# Patient Record
Sex: FEMALE | Race: WHITE | NOT HISPANIC OR LATINO | Employment: FULL TIME | URBAN - METROPOLITAN AREA
[De-identification: names, ages, dates, MRNs, and addresses within clinical notes are randomized per-mention and may not be internally consistent; named-entity substitution may affect disease eponyms.]

---

## 2018-06-20 ENCOUNTER — NURSE TRIAGE (OUTPATIENT)
Dept: NURSING | Facility: CLINIC | Age: 48
End: 2018-06-20

## 2018-06-20 NOTE — TELEPHONE ENCOUNTER
She will go to the ER for evaluation. She said she can urinate during the day just a trickle and her bladder still feels full. It seems worse at night.  Sugar Roche RN-[facility]    Reason for Disposition    [1] Unable to urinate (or only a few drops) > 4 hours AND     [2] bladder feels very full (e.g., palpable bladder or strong urge to urinate)    Additional Information    Negative: Shock suspected (e.g., cold/pale/clammy skin, too weak to stand, low BP, rapid pulse)    Negative: Sounds like a life-threatening emergency to the triager    Negative: Followed a genital area injury    Negative: Followed a genital area injury (penis, scrotum)    Negative: Vaginal discharge    Negative: Pus (white, yellow) or bloody discharge from end of penis    Negative: [1] Taking antibiotic for urinary tract infection (UTI) AND [2] female    Negative: [1] Taking antibiotic for urinary tract infection (UTI) AND [2] male    Negative: [1] Discomfort (pain, burning or stinging) when passing urine AND [2] pregnant    Negative: [1] Discomfort (pain, burning or stinging) when passing urine AND [2] postpartum < 1 month    Negative: [1] Discomfort (pain, burning or stinging) when passing urine AND [2] female    Negative: [1] Discomfort (pain, burning or stinging) when passing urine AND [2] male    Negative: Pain or itching in the vulvar area    Negative: Pain in scrotum is main symptom    Negative: Blood in the urine is main symptom    Negative: Symptoms arising from use of a urinary catheter ([place] or [place])    Protocols used: URINARY SYMPTOMS-ADULT-

## 2018-06-21 ENCOUNTER — OFFICE VISIT (OUTPATIENT)
Dept: FAMILY MEDICINE | Facility: CLINIC | Age: 48
End: 2018-06-21
Payer: COMMERCIAL

## 2018-06-21 VITALS
SYSTOLIC BLOOD PRESSURE: 136 MMHG | HEIGHT: 65 IN | TEMPERATURE: 98.5 F | WEIGHT: 168 LBS | BODY MASS INDEX: 27.99 KG/M2 | DIASTOLIC BLOOD PRESSURE: 80 MMHG | HEART RATE: 79 BPM | OXYGEN SATURATION: 100 %

## 2018-06-21 DIAGNOSIS — E04.1 NONTOXIC UNINODULAR GOITER: ICD-10-CM

## 2018-06-21 DIAGNOSIS — Z98.890 HISTORY OF UMBILICAL HERNIA REPAIR: ICD-10-CM

## 2018-06-21 DIAGNOSIS — R33.9 URINARY RETENTION: Primary | ICD-10-CM

## 2018-06-21 DIAGNOSIS — Z97.5 IUD (INTRAUTERINE DEVICE) IN PLACE: ICD-10-CM

## 2018-06-21 DIAGNOSIS — J45.20 ASTHMA, MILD INTERMITTENT, WELL-CONTROLLED: ICD-10-CM

## 2018-06-21 DIAGNOSIS — Z87.19 HISTORY OF UMBILICAL HERNIA REPAIR: ICD-10-CM

## 2018-06-21 DIAGNOSIS — Z00.01 ENCOUNTER FOR ROUTINE ADULT MEDICAL EXAM WITH ABNORMAL FINDINGS: ICD-10-CM

## 2018-06-21 PROBLEM — K42.9 UMBILICAL HERNIA: Status: ACTIVE | Noted: 2017-02-15

## 2018-06-21 LAB
ALBUMIN UR-MCNC: NEGATIVE MG/DL
ANION GAP SERPL CALCULATED.3IONS-SCNC: 7 MMOL/L (ref 3–14)
APPEARANCE UR: CLEAR
BACTERIA #/AREA URNS HPF: ABNORMAL /HPF
BILIRUB UR QL STRIP: NEGATIVE
BUN SERPL-MCNC: 12 MG/DL (ref 7–30)
CALCIUM SERPL-MCNC: 9 MG/DL (ref 8.5–10.1)
CHLORIDE SERPL-SCNC: 104 MMOL/L (ref 94–109)
CHOLEST SERPL-MCNC: 149 MG/DL
CO2 SERPL-SCNC: 28 MMOL/L (ref 20–32)
COLOR UR AUTO: YELLOW
CREAT SERPL-MCNC: 0.65 MG/DL (ref 0.52–1.04)
GFR SERPL CREATININE-BSD FRML MDRD: >90 ML/MIN/1.7M2
GLUCOSE SERPL-MCNC: 94 MG/DL (ref 70–99)
GLUCOSE UR STRIP-MCNC: NEGATIVE MG/DL
HDLC SERPL-MCNC: 92 MG/DL
HGB BLD-MCNC: 14.2 G/DL (ref 11.7–15.7)
HGB UR QL STRIP: ABNORMAL
KETONES UR STRIP-MCNC: ABNORMAL MG/DL
LDLC SERPL CALC-MCNC: 49 MG/DL
LEUKOCYTE ESTERASE UR QL STRIP: NEGATIVE
NITRATE UR QL: NEGATIVE
NON-SQ EPI CELLS #/AREA URNS LPF: ABNORMAL /LPF
NONHDLC SERPL-MCNC: 57 MG/DL
PH UR STRIP: 6 PH (ref 5–7)
POTASSIUM SERPL-SCNC: 4.1 MMOL/L (ref 3.4–5.3)
RBC #/AREA URNS AUTO: ABNORMAL /HPF
SODIUM SERPL-SCNC: 139 MMOL/L (ref 133–144)
SOURCE: ABNORMAL
SP GR UR STRIP: <=1.005 (ref 1–1.03)
TRIGL SERPL-MCNC: 39 MG/DL
TSH SERPL DL<=0.005 MIU/L-ACNC: 2.68 MU/L (ref 0.4–4)
UROBILINOGEN UR STRIP-ACNC: 0.2 EU/DL (ref 0.2–1)
WBC #/AREA URNS AUTO: ABNORMAL /HPF

## 2018-06-21 PROCEDURE — 99386 PREV VISIT NEW AGE 40-64: CPT | Performed by: NURSE PRACTITIONER

## 2018-06-21 PROCEDURE — 82306 VITAMIN D 25 HYDROXY: CPT | Performed by: NURSE PRACTITIONER

## 2018-06-21 PROCEDURE — 80048 BASIC METABOLIC PNL TOTAL CA: CPT | Performed by: NURSE PRACTITIONER

## 2018-06-21 PROCEDURE — 81001 URINALYSIS AUTO W/SCOPE: CPT | Performed by: NURSE PRACTITIONER

## 2018-06-21 PROCEDURE — 84443 ASSAY THYROID STIM HORMONE: CPT | Performed by: NURSE PRACTITIONER

## 2018-06-21 PROCEDURE — 36415 COLL VENOUS BLD VENIPUNCTURE: CPT | Performed by: NURSE PRACTITIONER

## 2018-06-21 PROCEDURE — 99213 OFFICE O/P EST LOW 20 MIN: CPT | Mod: 25 | Performed by: NURSE PRACTITIONER

## 2018-06-21 PROCEDURE — 80061 LIPID PANEL: CPT | Performed by: NURSE PRACTITIONER

## 2018-06-21 PROCEDURE — 85018 HEMOGLOBIN: CPT | Performed by: NURSE PRACTITIONER

## 2018-06-21 ASSESSMENT — PAIN SCALES - GENERAL: PAINLEVEL: NO PAIN (0)

## 2018-06-21 NOTE — LETTER
[address] [address]  [phone]                                                                                                           Marcella Justice  [address]  [place] [address]    June 22, 2018      Shawn Naranjo   I am happy to report that your labs for vitamin D, cholesterol, thyroid, anemia and kidney function were normal.  Please continue with the plan as discussed in your visit and return if your symptoms are worsening.     Feel free to contact me with any questions or concerns.  Thank you for allowing me to participate in your care.     Suzy Fajardo, APRN CNP/smj    Results for orders placed or performed in visit on 06/21/18   *UA reflex to Microscopic and Culture ([facility] and [facility] (except [place] and [place])   Result Value Ref Range    Color Urine Yellow     Appearance Urine Clear     Glucose Urine Negative NEG^Negative mg/dL    Bilirubin Urine Negative NEG^Negative    Ketones Urine Trace (A) NEG^Negative mg/dL    Specific Gravity Urine <=1.005 1.003 - 1.035    Blood Urine Small (A) NEG^Negative    pH Urine 6.0 5.0 - 7.0 pH    Protein Albumin Urine Negative NEG^Negative mg/dL    Urobilinogen Urine 0.2 0.2 - 1.0 EU/dL    Nitrite Urine Negative NEG^Negative    Leukocyte Esterase Urine Negative NEG^Negative    Source Midstream Urine    Urine Microscopic   Result Value Ref Range    WBC Urine 0 - 5 OTO5^0 - 5 /HPF    RBC Urine O - 2 OTO2^O - 2 /HPF    Squamous Epithelial /LPF Urine Moderate (A) FEW^Few /LPF    Bacteria Urine Few (A) NEG^Negative /HPF   Vitamin D Deficiency   Result Value Ref Range    Vitamin D Deficiency screening 30 20 - 75 ug/L   Lipid panel reflex to direct LDL Fasting   Result Value Ref Range    Cholesterol 149 <200 mg/dL    Triglycerides 39 <150 mg/dL    HDL Cholesterol 92 >49 mg/dL    LDL Cholesterol Calculated 49 <100 mg/dL    Non HDL Cholesterol 57 <130 mg/dL   Hemoglobin    Result Value Ref Range    Hemoglobin 14.2 11.7 - 15.7 g/dL   TSH with free T4 reflex   Result Value Ref Range    TSH 2.68 0.40 - 4.00 mU/L   Basic metabolic panel  (Ca, Cl, CO2, Creat, Gluc, K, Na, BUN)   Result Value Ref Range    Sodium 139 133 - 144 mmol/L    Potassium 4.1 3.4 - 5.3 mmol/L    Chloride 104 94 - 109 mmol/L    Carbon Dioxide 28 20 - 32 mmol/L    Anion Gap 7 3 - 14 mmol/L    Glucose 94 70 - 99 mg/dL    Urea Nitrogen 12 7 - 30 mg/dL    Creatinine 0.65 0.52 - 1.04 mg/dL    GFR Estimate >90 >60 mL/min/1.7m2    GFR Estimate If Black >90 >60 mL/min/1.7m2    Calcium 9.0 8.5 - 10.1 mg/dL

## 2018-06-21 NOTE — PROGRESS NOTES
SUBJECTIVE:   CC: Marcella Justice is an 47 year old woman who presents for preventive health visit.     Healthy Habits:    Do you get at least three servings of calcium containing foods daily (dairy, green leafy vegetables, etc.)? yes    Amount of exercise or daily activities, outside of work: sometimes    Problems taking medications regularly not applicable    Medication side effects: No    Have you had an eye exam in the past two years? no    Do you see a dentist twice per year? yes    Do you have sleep apnea, excessive snoring or daytime drowsiness?no  Pap smear done on this date: 2016 (approximately), by this group: [facility], results were NIL, HPV neg.       Urinary Symptoms  Onset: x 2 weeks    Description:  Vaginal Discharge: none   Itching (Pruritis): no   Burning sensation:  no   Odor: no     Accompanying Signs & Symptoms:  Pain with Urination: no   Abdominal Pain: yes discomfort  Fever: no     History:   Sexually active: YES  New Partner: no   Possibility of Pregnancy:  No    Precipitating factors:   Recent Antibiotic Use: no     Alleviating factors:  None    Having a hard time emptying out the bladder, states this is much better today versus yesterday and the day before.  She states she now does feel empty after and the stream is stronger than it was.  States symptoms are worse at night.  Sometimes is unable to urinate when gets up in the middle of the night.  She is able to urinate better during the daytime.  Has history of abdominal surgery (umbilical hernia repair and tubal ligation) but no others.  Denies pelvic pain, just pressure when unable to urinate.  She had four vaginal deliveries previously.  Denies back injury.  Denies groin numbness, no numbness or weakness or shooting pains. Does see chiropractor about every 3 weeks for mid back pain and adjustments.  Had has some lower back pain lately which has been mild, but, again, no shooting pain.  Has had this issue in the past about a  year ago and resolved on its own.  Denies incontinence- stress or urgency.    Therapies Tried and outcome: no medication was taken.  Mirena for heavy periods, placed 1.5 years ago.  Has not bled at all since September 2017.  Was not anemic at this time.    Today's PHQ-2 Score:   PHQ-2 ( 1999 [employer]) 6/21/2018 3/27/2014   Q1: Little interest or pleasure in doing things 0 0   Q2: Feeling down, depressed or hopeless 0 0   PHQ-2 Score 0 0       Abuse: Current or Past(Physical, Sexual or Emotional)- No  Do you feel safe in your environment - Yes    Social History   Substance Use Topics     Smoking status: Never Smoker     Smokeless tobacco: Never Used     Alcohol use Yes      Comment: a little      If you drink alcohol do you typically have >3 drinks per day or >7 drinks per week? No                     Reviewed orders with patient.  Reviewed health maintenance and updated orders accordingly - Yes  Labs reviewed in EPIC  BP Readings from Last 3 Encounters:   06/21/18 136/80   03/27/14 (!) 139/92    Wt Readings from Last 3 Encounters:   06/21/18 168 lb (76.2 kg)   03/27/14 160 lb 9.6 oz (72.8 kg)                  Patient Active Problem List   Diagnosis     Asthma, mild intermittent, well-controlled     Nontoxic uninodular goiter     History of umbilical hernia repair     IUD (intrauterine device) in place     Urinary retention     Past Surgical History:   Procedure Laterality Date     HERNIA REPAIR  02/2017    umbical hernia       Social History   Substance Use Topics     Smoking status: Never Smoker     Smokeless tobacco: Never Used     Alcohol use Yes      Comment: a little      Family History   Problem Relation Age of Onset     Thyroid Disease Mother          No current outpatient prescriptions on file.     Allergies   Allergen Reactions     Bactrim [Sulfamethoxazole W/Trimethoprim]      Cyclobenzaprine      Made patient freak out for about 24 hours      Morphine      No lab results found.     Mammogram not appropriate  for this patient based on age.    Pertinent mammograms are reviewed under the imaging tab.  History of abnormal Pap smear: NO - age 30-65 PAP every 5 years with negative HPV co-testing recommended    Reviewed and updated as needed this visit by clinical staff  Tobacco  Allergies  Meds  Problems  Med Hx  Surg Hx  Fam Hx  Soc Hx          Reviewed and updated as needed this visit by Provider  Tobacco  Allergies  Meds  Problems  Med Hx  Surg Hx  Fam Hx  Soc Hx         Past Medical History:   Diagnosis Date     Asthma       Past Surgical History:   Procedure Laterality Date     HERNIA REPAIR  02/2017    umbical hernia     Obstetric History     No data available      Obstetric Comments   4 vaginal deliveries       ROS:  CONSTITUTIONAL: NEGATIVE for fever, chills, change in weight  INTEGUMENTARU/SKIN: NEGATIVE for worrisome rashes, moles or lesions  EYES: NEGATIVE for vision changes or irritation  ENT: NEGATIVE for ear, mouth and throat problems  RESP: NEGATIVE for significant cough or SOB  BREAST: NEGATIVE for masses, tenderness or discharge  CV: NEGATIVE for chest pain, palpitations or peripheral edema  GI: NEGATIVE for nausea, abdominal pain, heartburn, or change in bowel habits   female: see HPI  MUSCULOSKELETAL: NEGATIVE for significant arthralgias or myalgia  NEURO: NEGATIVE for weakness, dizziness or paresthesias  PSYCHIATRIC: NEGATIVE for changes in mood or affect    OBJECTIVE:   There were no vitals taken for this visit.  EXAM:  GENERAL: healthy, alert and no distress  EYES: Eyes grossly normal to inspection, PERRL and conjunctivae and sclerae normal  HENT: ear canals and TM's normal, nose and mouth without ulcers or lesions  NECK: no adenopathy, no asymmetry, masses, or scars and thyroid normal to palpation  RESP: lungs clear to auscultation - no rales, rhonchi or wheezes  CV: regular rate and rhythm, normal S1 S2, no S3 or S4, no murmur, click or rub, no peripheral edema and peripheral pulses  strong  ABDOMEN: soft, nontender, no hepatosplenomegaly, no masses and bowel sounds normal  bladder not palpable, no suprapubic tenderness  MS: no gross musculoskeletal defects noted, no edema  SKIN: no suspicious lesions or rashes  NEURO: Normal strength and tone, mentation intact and speech normal  PSYCH: mentation appears normal, affect normal/bright    ASSESSMENT/PLAN:   1. Encounter for routine adult medical exam with abnormal findings  - Vitamin D Deficiency  - Lipid panel reflex to direct LDL Fasting  - Hemoglobin    2. Urinary retention  Resolved as of today but has had a similar episode in the past.  No palpable abdominal masses.  Urine not indicative of infection.  Will have patient see urology.  Consider pelvic/abdominal US as well pending lab results.  - *UA reflex to Microscopic and Culture ([facility] [place] [facility] (except [place] and [place])  - Urine Microscopic  - UROLOGY ADULT REFERRAL  - Basic metabolic panel  (Ca, Cl, CO2, Creat, Gluc, K, Na, BUN)    3. IUD (intrauterine device) in place  Place in 2016 for heavy menses.  - levonorgestrel (MIRENA) 20 MCG/24HR IUD; 1 each (20 mcg) by Intrauterine route once for 1 dose  Dispense: 1 each; Refill: 0    4. Asthma, mild intermittent, well-controlled  Only flares with upper respiratory tract infections.  Has only albuterol inhaler.    5. History of umbilical hernia repair  1 year ago.    6. Nontoxic uninodular goiter  Reviewed previous ultrasounds and biopsies.  Will repeat in one year to ensure stability, sooner if symptomatic.  - TSH with free T4 reflex    COUNSELING:   Reviewed preventive health counseling, as reflected in patient instructions       Regular exercise       Healthy diet/nutrition       Vision screening       Osteoporosis Prevention/Bone Health         reports that she has never smoked. She has never used smokeless tobacco.    Estimated body mass index is 26.73 kg/(m^2) as calculated from the following:    Height as of 3/27/14: 5'  "5\" (1.651 m).    Weight as of 3/27/14: 160 lb 9.6 oz (72.8 kg).   Weight management plan: Discussed healthy diet and exercise guidelines and patient will follow up in 12 months in clinic to re-evaluate.    Counseling Resources:  ATP IV Guidelines  Pooled Cohorts Equation Calculator  Breast Cancer Risk Calculator  FRAX Risk Assessment  ICSI Preventive Guidelines  Dietary Guidelines for Americans, 2010  USDA's MyPlate  ASA Prophylaxis  Lung CA Screening    SHARMAINE Martins CNP  [facility]    Patient Instructions     At [facility], we strive to deliver an exceptional experience to you, every time we see you.  If you receive a survey in the mail, please send us back your thoughts. We really do value your feedback.    Based on your medical history, these are the current health maintenance/preventive care services that you are due for (some may have been done at this visit.)  Health Maintenance Due   Topic Date Due     PHQ-2 Q1 YR  07/18/1981     TETANUS IMMUNIZATION (SYSTEM ASSIGNED)  07/18/1987     HIV SCREEN (SYSTEM ASSIGNED)  07/18/1987     PAP SCREENING Q3 YR (SYSTEM ASSIGNED)  07/18/1990     LIPID SCREEN Q5 YR FEMALE (SYSTEM ASSIGNED)  07/18/2014       Suggested websites for health information:  Www."[employer] : Up to date and easily searchable information on multiple topics.  Www.medlineplus.gov : medication info, interactive tutorials, watch real surgeries online  Www.familydoctor.org : good info from the Academy of Family Physicians  Www.cdc.gov : public health info, travel advisories, epidemics (H1N1)  Www.aap.org : children's health info, normal development, vaccinations  Www.health.state.[place] : [place] dept of health, public health issues in [place], N1N1    Your care team:                            Family Medicine Internal Medicine   MD David Forrest MD Shantel Branch-Fleming, MD Katya Georgiev PA-C Megan Hill, APRN CNP Nam Ho, MD Pediatrics "   APARNA Johnson CNP Paula Brito, MD Amelia Massimini APRN CNP Shaista Malik, MD Bethany Templen, MD Deborah Mielke, MD Kim Thein, APRN CNP      Clinic hours: Monday - Thursday 7 am-7 pm; Fridays 7 am-5 pm.   Urgent care: Monday - Friday 11 am-9 pm; Saturday and Sunday 9 am-5 pm.  Pharmacy : Monday -Thursday 8 am-8 pm; Friday 8 am-6 pm; Saturday and Sunday 9 am-5 pm.     Clinic: [phone]   Pharmacy: [phone]        Preventive Health Recommendations  Female Ages 40 to 49    Yearly exam:     See your health care provider every year in order to  1. Review health changes.   2. Discuss preventive care.    3. Review your medicines if your doctor prescribed any.      Get a Pap test every three years (unless you have an abnormal result and your provider advises testing more often).      If you get Pap tests with HPV test, you only need to test every 5 years, unless you have an abnormal result. You do not need a Pap test if your uterus was removed (hysterectomy) and you have not had cancer.      You should be tested each year for STDs (sexually transmitted diseases), if you're at risk.       Ask your doctor if you should have a mammogram.      Have a colonoscopy (test for colon cancer) if someone in your family has had colon cancer or polyps before age 50.       Have a cholesterol test every 5 years.       Have a diabetes test (fasting glucose) after age 45. If you are at risk for diabetes, you should have this test every 3 years.    Shots: Get a flu shot each year. Get a tetanus shot every 10 years.     Nutrition:     Eat at least 5 servings of fruits and vegetables each day.    Eat whole-grain bread, whole-wheat pasta and brown rice instead of white grains and rice.    Talk to your provider about Calcium and Vitamin D.     Lifestyle    Exercise at least 150 minutes a week (an average of 30 minutes a day, 5 days a week). This will help you control your weight and prevent  disease.    Limit alcohol to one drink per day.    No smoking.     Wear sunscreen to prevent skin cancer.    See your dentist every six months for an exam and cleaning.

## 2018-06-21 NOTE — PATIENT INSTRUCTIONS
At [facility], we strive to deliver an exceptional experience to you, every time we see you.  If you receive a survey in the mail, please send us back your thoughts. We really do value your feedback.    Based on your medical history, these are the current health maintenance/preventive care services that you are due for (some may have been done at this visit.)  Health Maintenance Due   Topic Date Due     PHQ-2 Q1 YR  07/18/1981     TETANUS IMMUNIZATION (SYSTEM ASSIGNED)  07/18/1987     HIV SCREEN (SYSTEM ASSIGNED)  07/18/1987     PAP SCREENING Q3 YR (SYSTEM ASSIGNED)  07/18/1990     LIPID SCREEN Q5 YR FEMALE (SYSTEM ASSIGNED)  07/18/2014       Suggested websites for health information:  Www.[facility].org : Up to date and easily searchable information on multiple topics.  Www.medlineplus.gov : medication info, interactive tutorials, watch real surgeries online  Www.familydoctor.org : good info from the Academy of Family Physicians  Www.cdc.gov : public health info, travel advisories, epidemics (H1N1)  Www.aap.org : children's health info, normal development, vaccinations  Www.health.[facility].[place] : [place] dept of health, public health issues in [place], N1N1    Your care team:                            Family Medicine Internal Medicine   MD David Forrest MD Shantel Branch-Fleming, MD Katya Georgiev PA-C Megan Hill, APRDEANN Sutherland MD Pediatrics   APARNA Johnson, MD Brandi Montoya APRN CNP   MD Keily Larry MD Deborah Mielke, MD Kim Thein, APRN [facility]      Clinic hours: Monday - Thursday 7 am-7 pm; Fridays 7 am-5 pm.   Urgent care: Monday - Friday 11 am-9 pm; Saturday and Sunday 9 am-5 pm.  Pharmacy : Monday -Thursday 8 am-8 pm; Friday 8 am-6 pm; Saturday and Sunday 9 am-5 pm.     Clinic: [phone]   Pharmacy: [phone]        Preventive Health Recommendations  Female Ages 40 to 49    Yearly exam:     See your  health care provider every year in order to  1. Review health changes.   2. Discuss preventive care.    3. Review your medicines if your doctor prescribed any.      Get a Pap test every three years (unless you have an abnormal result and your provider advises testing more often).      If you get Pap tests with HPV test, you only need to test every 5 years, unless you have an abnormal result. You do not need a Pap test if your uterus was removed (hysterectomy) and you have not had cancer.      You should be tested each year for STDs (sexually transmitted diseases), if you're at risk.       Ask your doctor if you should have a mammogram.      Have a colonoscopy (test for colon cancer) if someone in your family has had colon cancer or polyps before age 50.       Have a cholesterol test every 5 years.       Have a diabetes test (fasting glucose) after age 45. If you are at risk for diabetes, you should have this test every 3 years.    Shots: Get a flu shot each year. Get a tetanus shot every 10 years.     Nutrition:     Eat at least 5 servings of fruits and vegetables each day.    Eat whole-grain bread, whole-wheat pasta and brown rice instead of white grains and rice.    Talk to your provider about Calcium and Vitamin D.     Lifestyle    Exercise at least 150 minutes a week (an average of 30 minutes a day, 5 days a week). This will help you control your weight and prevent disease.    Limit alcohol to one drink per day.    No smoking.     Wear sunscreen to prevent skin cancer.    See your dentist every six months for an exam and cleaning.

## 2018-06-21 NOTE — MR AVS SNAPSHOT
After Visit Summary   6/21/2018    Marcella Justice    MRN: [id number]           Patient Information     Date Of Birth          1970        Visit Information        Provider Department      6/21/2018 11:40 AM Suzy Fajardo APRN CNP [facility]        Today's Diagnoses     Urinary retention    -  1    IUD (intrauterine device) in place        Asthma, mild intermittent, well-controlled        History of umbilical hernia repair        Nontoxic uninodular goiter        Encounter for routine adult medical exam with abnormal findings          Care Instructions    At [facility], we strive to deliver an exceptional experience to you, every time we see you.  If you receive a survey in the mail, please send us back your thoughts. We really do value your feedback.    Based on your medical history, these are the current health maintenance/preventive care services that you are due for (some may have been done at this visit.)  Health Maintenance Due   Topic Date Due     PHQ-2 Q1 YR  07/18/1981     TETANUS IMMUNIZATION (SYSTEM ASSIGNED)  07/18/1987     HIV SCREEN (SYSTEM ASSIGNED)  07/18/1987     PAP SCREENING Q3 YR (SYSTEM ASSIGNED)  07/18/1990     LIPID SCREEN Q5 YR FEMALE (SYSTEM ASSIGNED)  07/18/2014       Suggested websites for health information:  Www.[facility]Etohum : Up to date and easily searchable information on multiple topics.  Www.medlineplus.gov : medication info, interactive tutorials, watch real surgeries online  Www.familydoctor.org : good info from the Academy of Family Physicians  Www.cdc.gov : public health info, travel advisories, epidemics (H1N1)  Www.aap.org : children's health info, normal development, vaccinations  Www.health.state[place] : [place] dept of health, public health issues in [place], N1N1    Your care team:                            Family Medicine Internal Medicine   MD David Forrest MD Shantel Branch-Fleming, MD     Jana Catalan, APRN [facility]    Vladimir Sutherland MD Pediatrics   APARNA Johnson, MD rBandi Montoya APRN CNP   MD Keily Larry MD Deborah Mielke, MD Kim Thein, APRN [facility]      Clinic hours: Monday - Thursday 7 am-7 pm; Fridays 7 am-5 pm.   Urgent care: Monday - Friday 11 am-9 pm; Saturday and Sunday 9 am-5 pm.  Pharmacy : Monday -Thursday 8 am-8 pm; Friday 8 am-6 pm; Saturday and Sunday 9 am-5 pm.     Clinic: [phone]   Pharmacy: [phone]        Preventive Health Recommendations  Female Ages 40 to 49    Yearly exam:     See your health care provider every year in order to  1. Review health changes.   2. Discuss preventive care.    3. Review your medicines if your doctor prescribed any.      Get a Pap test every three years (unless you have an abnormal result and your provider advises testing more often).      If you get Pap tests with HPV test, you only need to test every 5 years, unless you have an abnormal result. You do not need a Pap test if your uterus was removed (hysterectomy) and you have not had cancer.      You should be tested each year for STDs (sexually transmitted diseases), if you're at risk.       Ask your doctor if you should have a mammogram.      Have a colonoscopy (test for colon cancer) if someone in your family has had colon cancer or polyps before age 50.       Have a cholesterol test every 5 years.       Have a diabetes test (fasting glucose) after age 45. If you are at risk for diabetes, you should have this test every 3 years.    Shots: Get a flu shot each year. Get a tetanus shot every 10 years.     Nutrition:     Eat at least 5 servings of fruits and vegetables each day.    Eat whole-grain bread, whole-wheat pasta and brown rice instead of white grains and rice.    Talk to your provider about Calcium and Vitamin D.     Lifestyle    Exercise at least 150 minutes a week (an average of 30 minutes a day, 5 days a  week). This will help you control your weight and prevent disease.    Limit alcohol to one drink per day.    No smoking.     Wear sunscreen to prevent skin cancer.    See your dentist every six months for an exam and cleaning.          Follow-ups after your visit        Additional Services     UROLOGY ADULT REFERRAL       Your provider has referred you to: [place]: [facility] [phone]   [email or web address]    Please be aware that coverage of these services is subject to the terms and limitations of your health insurance plan.  Call member services at your health plan with any benefit or coverage questions.      Please bring the following with you to your appointment:    (1) Any X-Rays, CTs or MRIs which have been performed.  Contact the facility where they were done to arrange for  prior to your scheduled appointment.    (2) List of current medications  (3) This referral request   (4) Any documents/labs given to you for this referral                  Who to contact     If you have questions or need follow up information about today's clinic visit or your schedule please contact [facility] directly at [phone].  Normal or non-critical lab and imaging results will be communicated to you by MyChart, letter or phone within 4 business days after the clinic has received the results. If you do not hear from us within 7 days, please contact the clinic through MyChart or phone. If you have a critical or abnormal lab result, we will notify you by phone as soon as possible.  Submit refill requests through [employer] or call your pharmacy and they will forward the refill request to us. Please allow 3 business days for your refill to be completed.          Additional Information About Your Visit        [employer] Information     [employer] lets you send messages to your doctor, view your test results,  "renew your prescriptions, schedule appointments and more. To sign up, go to [email or web address] . Click on \"Log in\" on the left side of the screen, which will take you to the Welcome page. Then click on \"Sign up Now\" on the right side of the page.     You will be asked to enter the access code listed below, as well as some personal information. Please follow the directions to create your username and password.     Your access code is: [email or web address]  Expires: 2018 12:41 PM     Your access code will  in 90 days. If you need help or a new code, please call your [place] clinic or [phone].        Care EveryWhere ID     This is your Care EveryWhere ID. This could be used by other organizations to access your [place] medical records  [id number]        Your Vitals Were     Pulse Temperature Height Last Period Pulse Oximetry BMI (Body Mass Index)    79 98.5  F (36.9  C) (Oral) 5' 4.5\" (1.638 m) (LMP Unknown) 100% 28.39 kg/m2       Blood Pressure from Last 3 Encounters:   18 136/80   14 (!) 139/92    Weight from Last 3 Encounters:   18 168 lb (76.2 kg)   14 160 lb 9.6 oz (72.8 kg)              We Performed the Following     *UA reflex to Microscopic and Culture ([facility] (except [place] and [place])     Basic metabolic panel  (Ca, Cl, CO2, Creat, Gluc, K, Na, BUN)     GLUCOSE     Hemoglobin     Lipid panel reflex to direct LDL Fasting     TSH with free T4 reflex     Urine Microscopic     UROLOGY ADULT REFERRAL     Vitamin D Deficiency          Today's Medication Changes          These changes are accurate as of 18 12:41 PM.  If you have any questions, ask your nurse or doctor.               Stop taking these medicines if you haven't already. Please contact your care team if you have questions.     albuterol 108 (90 Base) MCG/ACT Inhaler   Commonly known as:  PROAIR HFA/PROVENTIL HFA/VENTOLIN HFA   Stopped by:  Suzy Fajardo APRN CNP        "    benzonatate 100 MG capsule   Commonly known as:  TESSALON   Stopped by:  Suzy Fajardo APRN CNP           NEW MED   Stopped by:  Suzy Fajardo APRN CNP                    Primary Care Provider Fax #    Physician No Ref-Primary [phone]       No address on file        Equal Access to Services     YENY LITZY : Hadii aad ku hadasho Soomaali, waaxda luqadaha, qaybta kaalmada adeegyada, waxay radhain haybeth yanezdeenajonathan gallo . So [facility] [phone].    ATENCIÓN: Si habla español, tiene a kaur disposición servicios gratuitos de asistencia lingüística. Llame al [phone].    We comply with applicable federal civil rights laws and [place] laws. We do not discriminate on the basis of race, color, national origin, age, disability, sex, sexual orientation, or gender identity.            Thank you!     Thank you for choosing [facility]  for your care. Our goal is always to provide you with excellent care. Hearing back from our patients is one way we can continue to improve our services. Please take a few minutes to complete the written survey that you may receive in the mail after your visit with us. Thank you!             Your Updated Medication List - Protect others around you: Learn how to safely use, store and throw away your medicines at www.disposemymeds.org.      Notice  As of 6/21/2018 12:41 PM    You have not been prescribed any medications.

## 2018-06-21 NOTE — Clinical Note
Please abstract the following data from this visit with this patient into the appropriate field in Epic:  Pap smear done on this date: 3/21/2016 (approximately), by this group: [facility], results were NIL and HPV negative.

## 2018-06-22 LAB — DEPRECATED CALCIDIOL+CALCIFEROL SERPL-MC: 30 UG/L (ref 20–75)

## 2018-06-22 ASSESSMENT — ASTHMA QUESTIONNAIRES: ACT_TOTALSCORE: 25

## 2018-08-27 ENCOUNTER — TRANSFERRED RECORDS (OUTPATIENT)
Dept: HEALTH INFORMATION MANAGEMENT | Facility: CLINIC | Age: 48
End: 2018-08-27

## 2018-08-28 ENCOUNTER — PRE VISIT (OUTPATIENT)
Dept: UROLOGY | Facility: CLINIC | Age: 48
End: 2018-08-28

## 2018-08-28 NOTE — TELEPHONE ENCOUNTER
PREVISIT INFORMATION                                                    Marcella Justice scheduled for future visit at [facility] specialty clinics.    Patient is scheduled to see Dr. Lea on 8/30/18 at 2:30pm  Reason for visit: difficulty passing urine  Referring provider: Suzy SCHMID CNP  Has patient seen previous specialist? No  Medical Records:  Available in chart.  Patient was previously seen at a [place] or [facility] facility and Care Everywhere through [facility]. Patient had recent ER visit at [facility] on 8/27/18. Per patient, CT scan was completed. Requested for 8/27/18 CT images to be pushed to [facility] and reports to be faxed to [phone].    REVIEW                                                      New patient packet mailed to patient: No  Medication reconciliation complete: No      Current Outpatient Prescriptions   Medication Sig Dispense Refill     levonorgestrel (MIRENA) 20 MCG/24HR IUD 1 each (20 mcg) by Intrauterine route once for 1 dose 1 each 0       Allergies: Bactrim [sulfamethoxazole w/trimethoprim]; Cyclobenzaprine; and Morphine    PLAN/FOLLOW-UP NEEDED                                                      Previsit review complete.  Patient will see provider at future scheduled appointment. Patient aware of appointment date, time, and location.      Pita Blair RN, BSN

## 2018-08-30 ENCOUNTER — OFFICE VISIT (OUTPATIENT)
Dept: UROLOGY | Facility: CLINIC | Age: 48
End: 2018-08-30
Payer: COMMERCIAL

## 2018-08-30 VITALS
OXYGEN SATURATION: 100 % | HEART RATE: 89 BPM | HEIGHT: 65 IN | BODY MASS INDEX: 28.99 KG/M2 | SYSTOLIC BLOOD PRESSURE: 141 MMHG | WEIGHT: 174 LBS | DIASTOLIC BLOOD PRESSURE: 90 MMHG

## 2018-08-30 DIAGNOSIS — R33.9 URINARY RETENTION: Primary | ICD-10-CM

## 2018-08-30 PROCEDURE — 99204 OFFICE O/P NEW MOD 45 MIN: CPT | Performed by: UROLOGY

## 2018-08-30 ASSESSMENT — PAIN SCALES - GENERAL: PAINLEVEL: MODERATE PAIN (5)

## 2018-08-30 NOTE — MR AVS SNAPSHOT
After Visit Summary   8/30/2018    Marcella Justice    MRN: [id number]           Patient Information     Date Of Birth          1970        Visit Information        Provider Department      8/30/2018 2:30 PM Freddy Lea MD [facility]        Today's Diagnoses     Urinary retention    -  1      Care Instructions    Follow up with Dr Lacey Machuca on 9/5/18 10:30 am. Call [phone] with any questions           Follow-ups after your visit        Your next 10 appointments already scheduled     Sep 05, 2018 10:30 AM CDT   Return Visit with Lacey Machuca MD   [facility] ([facility])    [facility]  [address]  [facility] [address] [phone]              Who to contact     If you have questions or need follow up information about today's clinic visit or your schedule please contact [facility] directly at [phone].  Normal or non-critical lab and imaging results will be communicated to you by Dr. TATTOFFhart, letter or phone within 4 business days after the clinic has received the results. If you do not hear from us within 7 days, please contact the clinic through [email or web address] or phone. If you have a critical or abnormal lab result, we will notify you by phone as soon as possible.  Submit refill requests through [employer] or call your pharmacy and they will forward the refill request to us. Please allow 3 business days for your refill to be completed.          Additional Information About Your Visit        Dr. TATTOFFhart Information     [employer] gives you secure access to your electronic health record. If you see a primary care provider, you can also send messages to your care team and make appointments. If you have questions, please call your primary care clinic.  If you do not have a primary care provider, please call [phone] and they will assist you.      [employer] is an electronic gateway  "that provides easy, online access to your medical records. With [employer], you can request a clinic appointment, read your test results, renew a prescription or communicate with your care team.     To access your existing account, please contact your [facility] or call [phone] for assistance.        Care EveryWhere ID     This is your Care EveryWhere ID. This could be used by other organizations to access your [place] medical records  [id number]        Your Vitals Were     Pulse Height Pulse Oximetry BMI (Body Mass Index)          89 1.638 m (5' 4.5\") 100% 29.41 kg/m2         Blood Pressure from Last 3 Encounters:   08/30/18 141/90   06/21/18 136/80   03/27/14 (!) 139/92    Weight from Last 3 Encounters:   08/30/18 78.9 kg (174 lb)   06/21/18 76.2 kg (168 lb)   03/27/14 72.8 kg (160 lb 9.6 oz)              Today, you had the following     No orders found for display       Primary Care Provider Fax #    Physician No Ref-Primary [phone]       No address on file        Equal Access to Services     [facility] CenterPAUL : Hadii kina Atkinson, wareynaldoda juanito, qabobby pak, jesus gallo . So [facility] [phone].    ATENCIÓN: Si habla español, tiene a kaur disposición servicios gratuitos de asistencia lingüística. Phan al [phone].    We comply with applicable federal civil rights laws and [place] laws. We do not discriminate on the basis of race, color, national origin, age, disability, sex, sexual orientation, or gender identity.            Thank you!     Thank you for choosing [facility]  for your care. Our goal is always to provide you with excellent care. Hearing back from our patients is one way we can continue to improve our services. Please take a few minutes to complete the written survey that you may receive in the mail after your visit with us. Thank you!             Your Updated Medication List - Protect " others around you: Learn how to safely use, store and throw away your medicines at www.disposemymeds.org.          This list is accurate as of 8/30/18  3:32 PM.  Always use your most recent med list.                   Brand Name Dispense Instructions for use Diagnosis    levonorgestrel 20 MCG/24HR IUD    MIRENA    1 each    1 each (20 mcg) by Intrauterine route once for 1 dose    IUD (intrauterine device) in place

## 2018-08-30 NOTE — PROGRESS NOTES
Urology Consult History and Physical    Name: Marcella Justice    MRN: [id number]   YOB: 1970       We were asked to see Marcella Justice at the request of Suzy Gage PA-C for evaluation and treatment of urinary retention.        Chief Complaint:   Urinary retention    History is obtained from the patient            History of Present Illness:   Marcella Justice is a 48 year old female who is being seen for evaluation of urinary retention.    Intermittent urinary retention for the past few years with episodes every ~3 months. Last occurrence 2 months prior had episodes of inability to void overnight which then resolved the next day. This occurred for 2 days in a row. Since that time she has been voiding well. She then noted the force of her urinary stream started to slow last Tuesday 8/21. This progressed to the point where on Sunday she felt she was not emptying her bladder and was unable to void for >12 hours and presented to the ER on 8/27 where a may was placed with >1L output. This was her first time needing catheter placement. This was associated with some bilateral flank pain - this pain has persisted despite catheter placement. U/A was negative for infection. SCr 0.68. CT scan was completed with no urologic abnormalities and finding of enlarged uterus with fibroids. She denies any numbness or tingling in her extremities, any changes in balance or gait, no blurring or loss of vision.              Past Medical History:     Past Medical History:   Diagnosis Date     Asthma             Past Surgical History:     Past Surgical History:   Procedure Laterality Date     HERNIA REPAIR  02/2017    umbical hernia     TUBAL LIGATION              Social History:     Social History   Substance Use Topics     Smoking status: Never Smoker     Smokeless tobacco: Never Used     Alcohol use Yes      Comment: a little        History   Smoking Status     Never Smoker   Smokeless Tobacco     Never  "Used            Family History:     Family History   Problem Relation Age of Onset     Thyroid Disease Mother               Allergies:     Allergies   Allergen Reactions     Bactrim [Sulfamethoxazole W/Trimethoprim]      Cyclobenzaprine      Made patient freak out for about 24 hours      Morphine             Medications:     Current Outpatient Prescriptions   Medication Sig     levonorgestrel (MIRENA) 20 MCG/24HR IUD 1 each (20 mcg) by Intrauterine route once for 1 dose     No current facility-administered medications for this visit.              Review of Systems:     Skin: negative  Eyes: negative  Ears/Nose/Throat: negative  Respiratory: No shortness of breath, dyspnea on exertion, cough, or hemoptysis  Cardiovascular: negative  Gastrointestinal: negative  Genitourinary: as above  Musculoskeletal: negative  Neurologic: negative  Psychiatric: negative  Hematologic/Lymphatic/Immunologic: negative  Endocrine: negative          Physical Exam:   Patient Vitals for the past 24 hrs:   BP Pulse SpO2 Height Weight   08/30/18 1440 141/90 89 100 % 1.638 m (5' 4.5\") 78.9 kg (174 lb)     Body mass index is 29.41 kg/(m^2).     General: age-appropriate appearing female in NAD  HEENT: Head AT/NC, EOMI, CN Grossly intact  Lungs: no respiratory distress, or pursed lip breathing  Heart: No obvious jugular venous distension present  Back: no bony midline tenderness, no CVAT bilaterally.  Abdomen: soft, non-distended, non-tender. No organomegaly  Flank: No CVAT bilaterally   Lymph: no palpable inguinal lymphadenopathy.  LE: no edema.   Musculoskeltal: extremities normal, no peripheral edema  Skin: no suspicious lesions or rashes  Neuro: Alert, oriented, speech and mentation normal; moving all 4 extremities equally.  Psych: affect and mood normal          Data:   All laboratory data reviewed:    UA RESULTS:  Recent Labs   Lab Test  06/21/18   1159   COLOR  Yellow   APPEARANCE  Clear   URINEGLC  Negative   URINEBILI  Negative "   URINEKETONE  Trace*   SG  <=1.005   UBLD  Small*   URINEPH  6.0   PROTEIN  Negative   UROBILINOGEN  0.2   NITRITE  Negative   LEUKEST  Negative   RBCU  O - 2   WBCU  0 - 5       Lab Results   Component Value Date    CR 0.65 06/21/2018        All pertinent imaging reviewed:  Images reviewed from [facility] [place] reviewed personally and with the patient    IMPRESSION: No ureteral stones nor hydronephrosis. Prominent left renal pelvis.    Enlarged myomatous uterus. Right lower quadrant cyst is likely within the right ovary.    Scattered colonic diverticulosis without CT evidence of diverticulitis. Normal appendix.         Impression and Plan:   Impression:   49 y/o female with acute on chronic urinary retention s/p may placement on 8/27 with >1L in place. I discussed this case with Dr. Machuca who recommended continued bladder rest with the may catheter for at least 1 week following retention episode. Further workup may include cystoscopy or urodynamics, however ideally the bladder would have recovered from this episode. Dr. Machuca will see patient in follow up next Wednesday 9/5.      Plan:   Acute on chronic urinary retention  - Maintain may to gravity drainage  - Follow up with Dr. Machuca on 9/5     Thank you for the kind consultation.    Time spent: 45 minutes of which >50% was spent counseling.    Freddy Lea MD   Urology  [facility]  [facility] Phone: [phone]  [facility] Phone: [phone]

## 2018-08-30 NOTE — NURSING NOTE
"Marcella Justice's goals for this visit include:   Chief Complaint   Patient presents with     Consult     Urine retention       She requests these members of her care team be copied on today's visit information: Yes    PCP: No Ref-Primary, Physician    Referring Provider:  Jules Gottlieb MD  No address on file    /90 (BP Location: Right arm, Patient Position: Sitting, Cuff Size: Adult Regular)  Pulse 89  Ht 1.638 m (5' 4.5\")  Wt 78.9 kg (174 lb)  SpO2 100%  BMI 29.41 kg/m2    Do you need any medication refills at today's visit? No    "

## 2018-09-05 ENCOUNTER — OFFICE VISIT (OUTPATIENT)
Dept: UROLOGY | Facility: CLINIC | Age: 48
End: 2018-09-05
Attending: UROLOGY
Payer: COMMERCIAL

## 2018-09-05 VITALS
SYSTOLIC BLOOD PRESSURE: 146 MMHG | BODY MASS INDEX: 29.02 KG/M2 | HEIGHT: 65 IN | DIASTOLIC BLOOD PRESSURE: 84 MMHG | WEIGHT: 174.2 LBS | HEART RATE: 83 BPM

## 2018-09-05 DIAGNOSIS — N39.8 VOIDING DYSFUNCTION: ICD-10-CM

## 2018-09-05 DIAGNOSIS — R33.9 URINARY RETENTION: Primary | ICD-10-CM

## 2018-09-05 PROCEDURE — G0463 HOSPITAL OUTPT CLINIC VISIT: HCPCS | Mod: ZF

## 2018-09-05 ASSESSMENT — ENCOUNTER SYMPTOMS
DYSURIA: 0
FEVER: 0
DIFFICULTY URINATING: 1
CHILLS: 0
CONSTIPATION: 0

## 2018-09-05 NOTE — PROGRESS NOTES
September 5, 2018    CC: Urinary retention    HPI:  Marcella Justice is a 48 year old female who presents for evaluation of urinary retention referred by Dr Freddy Lea. Every 2-3 months for the past 2 years patient has had episodes of inability to void. She states that the last two times it happened she was unable to void overnight but then resolved in the morning. She was seen by her PCP who checked BMP and UA, which were unremarkable.    Ms. Justice states that she could feel this last episode starting. Over the course of 5 days prior to her inability to void she noted weak stream and feeling like she was incompletely emptying her bladder. She was very uncomfortable and eventually was unable to void for >12 hours. She was subsequently seen in the ED on 8/27.  A may was placed with 1L urine output. CT scan was completed which showed prominent left renal pelvis and enlarged myomatous uterus (8.3x10.2x6.4cm)    Patient states that in between these episodes of urinary retention she voids regularly, every 2-3 hours. Denies difficulty starting urination or straining to void. No dysuria. Nocturia 2-3 times per night. Has history of 4 vaginal deliveries. Denies symptoms of prolapse or stress incontinence.  A may was placed with 1L urine output.    Medications: Ibuprofen. Takes daily zyrtec  No history of DM or other neurological condition.     Past Medical History:   Diagnosis Date     Asthma      Past Surgical History:   Procedure Laterality Date     HERNIA REPAIR  02/2017    umbical hernia     TUBAL LIGATION       Social History     Social History     Marital status:      Spouse name: N/A     Number of children: N/A     Years of education: N/A     Occupational History     Not on file.     Social History Main Topics     Smoking status: Never Smoker     Smokeless tobacco: Never Used     Alcohol use Yes      Comment: a little      Drug use: No     Sexual activity: Yes     Partners: Male     Birth control/  "protection: IUD      Comment: Mirena     Other Topics Concern     Not on file     Social History Narrative     Family History   Problem Relation Age of Onset     Thyroid Disease Mother      Review of Systems     Constitutional:  Negative for fever and chills.   Eyes:  Decreased vision: cloudy.   Gastrointestinal:  Negative for constipation.   Genitourinary:  Positive for difficulty urinating. Negative for dysuria and excessive menstruation.   No neurologic symptoms    Allergies   Allergen Reactions     Bactrim [Sulfamethoxazole W/Trimethoprim]      Cyclobenzaprine      Made patient freak out for about 24 hours      Morphine      Current Outpatient Prescriptions   Medication     levonorgestrel (MIRENA) 20 MCG/24HR IUD     No current facility-administered medications for this visit.        /84 (BP Location: Left arm, Patient Position: Chair)  Pulse 83  Ht 1.638 m (5' 4.5\")  Wt 79 kg (174 lb 3.2 oz)  BMI 29.44 kg/m2 No LMP recorded. Patient is not currently having periods (Reason: IUD). Body mass index is 29.44 kg/(m^2).  Patient is alert, comfortable in no acute distress, non-labored breathing.   Abdomen is soft, non-tender, non-distended, no CVAT.    Normal external female genitalia.  Negative ESST. She has stage I support on supine strain. Pelvic remarkable for valsalva instead of kegels but without myofascial tenderness.    May filled with 300cc saline. Patient voided 325 ml. PVR 29 by bladder scan    A/P: Marcella Justice is a 48 year old F with intermittent, chronic urinary retention. Etiology unclear although may be related to pelvic floor dysfunction. Her symptoms are intermittent and therefore enlarged uterus is unlikely to be contributing to her symptoms. No obvious neurologic symptoms/disorders. Passed void trial today, may removed. Discussed voiding regulary and straight cath if she has issues with retention to avoid bladder injury.     Addendum:    The patient was seen and evaluated with " the resident.  The plan was formulated in conjunction with me and I agree with the above note with changes made as necessary.    Unclear etiology of voiding dysfunction and recent retention    Starks out today but patient taught to ISC in case    RTC one month to see me to reassess, sooner if needed    A total of 30 minutes were spent with the patient today, > 50% in counseling and coordination of care    Lacey Machuca MD MPH   of Urology    CC  Patient Care Team:  No Ref-Primary, Physician as PCP - General  SELF, REFERRED

## 2018-09-05 NOTE — NURSING NOTE
Patient tolerated filling bladder with 300 ml of saline well.   Removed catheter and patient went to void.  325 ml was emptied from her bladder,  Bladder scan   Showed 29 ml residual urine.   Self catheriterzation was taught and some sample of esay catheter were given to patient.   She will call with any questions or concerns.

## 2018-09-05 NOTE — PATIENT INSTRUCTIONS
Websites with free information:    [employer] patient website: www.voicesforpfd.org    Total Control Program: www.totalcontrolprogram.com      Please return to see me in one month at [facility]    It was a pleasure meeting with you today.  Thank you for allowing me and my team the privilege of caring for you today.  YOU are the reason we are here, and I truly hope we provided you with the excellent service you deserve.  Please let us know if there is anything else we can do for you so that we can be sure you are leaving completely satisfied with your care experience.

## 2018-09-05 NOTE — MR AVS SNAPSHOT
After Visit Summary   9/5/2018    Marcella Justice    MRN: [id number]           Patient Information     Date Of Birth          1970        Visit Information        Provider Department      9/5/2018 10:30 AM Lacey Machuca MD [facility]        Today's Diagnoses     Urinary retention    -  1    Voiding dysfunction          Care Instructions    Websites with free information:    American [employer]ro[employer] patient website: www.voicesforpfd.org    Total Control Program: www.totalcontrolprogram.com      Please return to see me in one month at [facility]    It was a pleasure meeting with you today.  Thank you for allowing me and my team the privilege of caring for you today.  YOU are the reason we are here, and I truly hope we provided you with the excellent service you deserve.  Please let us know if there is anything else we can do for you so that we can be sure you are leaving completely satisfied with your care experience.            Follow-ups after your visit        Who to contact     Please call your clinic at [phone] to:    Ask questions about your health    Make or cancel appointments    Discuss your medicines    Learn about your test results    Speak to your doctor            Additional Information About Your Visit        Reacho[employer] Information     [employer] gives you secure access to your electronic health record. If you see a primary care provider, you can also send messages to your care team and make appointments. If you have questions, please call your primary care clinic.  If you do not have a primary care provider, please call [phone] and they will assist you.      [employer] is an electronic gateway that provides easy, online access to your medical records. With [employer], you can request a clinic appointment, read your test results, renew a prescription or communicate with your care team.     To access your existing account, please contact your  "[facility] or call [phone] for assistance.        Care EveryWhere ID     This is your Care EveryWhere ID. This could be used by other organizations to access your [facility] medical records  [id number]        Your Vitals Were     Pulse Height BMI (Body Mass Index)             83 1.638 m (5' 4.5\") 29.44 kg/m2          Blood Pressure from Last 3 Encounters:   09/05/18 146/84   08/30/18 141/90   06/21/18 136/80    Weight from Last 3 Encounters:   09/05/18 79 kg (174 lb 3.2 oz)   08/30/18 78.9 kg (174 lb)   06/21/18 76.2 kg (168 lb)              Today, you had the following     No orders found for display       Primary Care Provider Fax #    Physician No Ref-Primary [phone]       No address on file        Equal Access to Services     YEYN MCGHEE : Gwyn Atkinson, erika solomon, kobi pak, jesus agllo . So [facility] [phone].    ATENCIÓN: Si habla español, tiene a kaur disposición servicios gratuitos de asistencia lingüística. Traciame al [phone].    We comply with applicable federal civil rights laws and [place] laws. We do not discriminate on the basis of race, color, national origin, age, disability, sex, sexual orientation, or gender identity.            Thank you!     Thank you for choosing [facility]  for your care. Our goal is always to provide you with excellent care. Hearing back from our patients is one way we can continue to improve our services. Please take a few minutes to complete the written survey that you may receive in the mail after your visit with us. Thank you!             Your Updated Medication List - Protect others around you: Learn how to safely use, store and throw away your medicines at www.disposemymeds.org.          This list is accurate as of 9/5/18  2:06 PM.  Always use your most recent med list.                   Brand Name Dispense Instructions for use Diagnosis "    levonorgestrel 20 MCG/24HR IUD    MIRENA    1 each    1 each (20 mcg) by Intrauterine route once for 1 dose    IUD (intrauterine device) in place

## 2018-09-05 NOTE — LETTER
9/5/2018       RE: Marcella Justice  [address]  [place] [address]     Dear Colleague,    Thank you for referring your patient, Marcella Justice, to the [facility] at [facility]. Please see a copy of my visit note below.    September 5, 2018    CC: Urinary retention    HPI:  Marcella Justice is a 48 year old female who presents for evaluation of urinary retention referred by Dr Freddy Lea. Every 2-3 months for the past 2 years patient has had episodes of inability to void. She states that the last two times it happened she was unable to void overnight but then resolved in the morning. She was seen by her PCP who checked BMP and UA, which were unremarkable.    Ms. Justice states that she could feel this last episode starting. Over the course of 5 days prior to her inability to void she noted weak stream and feeling like she was incompletely emptying her bladder. She was very uncomfortable and eventually was unable to void for >12 hours. She was subsequently seen in the ED on 8/27.  A may was placed with 1L urine output. CT scan was completed which showed prominent left renal pelvis and enlarged myomatous uterus (8.3x10.2x6.4cm)    Patient states that in between these episodes of urinary retention she voids regularly, every 2-3 hours. Denies difficulty starting urination or straining to void. No dysuria. Nocturia 2-3 times per night. Has history of 4 vaginal deliveries. Denies symptoms of prolapse or stress incontinence.  A may was placed with 1L urine output.    Medications: Ibuprofen. Takes daily zyrtec  No history of DM or other neurological condition.     Past Medical History:   Diagnosis Date     Asthma      Past Surgical History:   Procedure Laterality Date     HERNIA REPAIR  02/2017    umbical hernia     TUBAL LIGATION       Social History     Social History     Marital status:      Spouse name: N/A     Number of  "children: N/A     Years of education: N/A     Occupational History     Not on file.     Social History Main Topics     Smoking status: Never Smoker     Smokeless tobacco: Never Used     Alcohol use Yes      Comment: a little      Drug use: No     Sexual activity: Yes     Partners: Male     Birth control/ protection: IUD      Comment: Mirena     Other Topics Concern     Not on file     Social History Narrative     Family History   Problem Relation Age of Onset     Thyroid Disease Mother      Review of Systems     Constitutional:  Negative for fever and chills.   Eyes:  Decreased vision: cloudy.   Gastrointestinal:  Negative for constipation.   Genitourinary:  Positive for difficulty urinating. Negative for dysuria and excessive menstruation.   No neurologic symptoms    Allergies   Allergen Reactions     Bactrim [Sulfamethoxazole W/Trimethoprim]      Cyclobenzaprine      Made patient freak out for about 24 hours      Morphine      Current Outpatient Prescriptions   Medication     levonorgestrel (MIRENA) 20 MCG/24HR IUD     No current facility-administered medications for this visit.        /84 (BP Location: Left arm, Patient Position: Chair)  Pulse 83  Ht 1.638 m (5' 4.5\")  Wt 79 kg (174 lb 3.2 oz)  BMI 29.44 kg/m2 No LMP recorded. Patient is not currently having periods (Reason: IUD). Body mass index is 29.44 kg/(m^2).  Patient is alert, comfortable in no acute distress, non-labored breathing.   Abdomen is soft, non-tender, non-distended, no CVAT.    Normal external female genitalia.  Negative ESST. She has stage I support on supine strain. Pelvic remarkable for valsalva instead of kegels but without myofascial tenderness.    Starks filled with 300cc saline. Patient voided 325 ml. PVR 29 by bladder scan    A/P: Marcella Justice is a 48 year old F with intermittent, chronic urinary retention. Etiology unclear although may be related to pelvic floor dysfunction. Her symptoms are intermittent and therefore " enlarged uterus is unlikely to be contributing to her symptoms. No obvious neurologic symptoms/disorders. Passed void trial today, may removed. Discussed voiding regulary and straight cath if she has issues with retention to avoid bladder injury.     Addendum:    The patient was seen and evaluated with the resident.  The plan was formulated in conjunction with me and I agree with the above note with changes made as necessary.    Unclear etiology of voiding dysfunction and recent retention    May out today but patient taught to ISC in case    RTC one month to see me to reassess, sooner if needed    A total of 30 minutes were spent with the patient today, > 50% in counseling and coordination of care    Lacey Machuca MD MPH   of Urology    CC  Patient Care Team:  No Ref-Primary, Physician as PCP - General  SELF, REFERRED        Again, thank you for allowing me to participate in the care of your patient.      Sincerely,    Lacey Machuca MD

## 2018-09-05 NOTE — NURSING NOTE
Chief Complaint   Patient presents with     Consult     Urinary retention       See KARISHMA Marroquin 9/5/2018

## 2018-09-05 NOTE — LETTER
Date:September 6, 2018      Patient was self referred, no letter generated. Do not send.        [facility] Information

## 2018-09-10 ENCOUNTER — MYC MEDICAL ADVICE (OUTPATIENT)
Dept: FAMILY MEDICINE | Facility: CLINIC | Age: 48
End: 2018-09-10

## 2018-09-10 DIAGNOSIS — R33.9 URINARY RETENTION: Primary | ICD-10-CM

## 2018-09-24 ENCOUNTER — TRANSFERRED RECORDS (OUTPATIENT)
Dept: HEALTH INFORMATION MANAGEMENT | Facility: CLINIC | Age: 48
End: 2018-09-24

## 2018-10-09 ENCOUNTER — OFFICE VISIT (OUTPATIENT)
Dept: NEUROLOGY | Facility: CLINIC | Age: 48
End: 2018-10-09
Payer: COMMERCIAL

## 2018-10-09 VITALS
WEIGHT: 174.6 LBS | HEART RATE: 78 BPM | SYSTOLIC BLOOD PRESSURE: 132 MMHG | OXYGEN SATURATION: 100 % | HEIGHT: 65 IN | BODY MASS INDEX: 29.09 KG/M2 | DIASTOLIC BLOOD PRESSURE: 84 MMHG

## 2018-10-09 DIAGNOSIS — R33.9 URINARY RETENTION: Primary | ICD-10-CM

## 2018-10-09 PROCEDURE — 99204 OFFICE O/P NEW MOD 45 MIN: CPT | Performed by: PSYCHIATRY & NEUROLOGY

## 2018-10-09 RX ORDER — CETIRIZINE HYDROCHLORIDE 10 MG/1
10 TABLET ORAL DAILY
COMMUNITY

## 2018-10-09 ASSESSMENT — PAIN SCALES - GENERAL: PAINLEVEL: NO PAIN (0)

## 2018-10-09 NOTE — MR AVS SNAPSHOT
After Visit Summary   10/9/2018    Marcella Justice    MRN: [id number]           Patient Information     Date Of Birth          1970        Visit Information        Provider Department      10/9/2018 10:30 AM Yoel Ugarte MD [facility]        Today's Diagnoses     Urinary retention    -  1       Follow-ups after your visit        Follow-up notes from your care team     Return in about 3 weeks (around 10/30/2018).      Your next 10 appointments already scheduled     Oct 18, 2018 11:00 AM CDT   (Arrive by 10:30 AM)   MR LUMBAR SPINE W/O & W CONTRAST with MGMR1   [facility] ([facility])    [id number] [place] [id number] [phone]           How do I prepare for my exam? (Food and drink instructions) **If you will be receiving sedation or general anesthesia, please see special notes below.**  How do I prepare for my exam? (Other instructions) Take your medicines as usual, unless your doctor tells you not to. You may or may not receive intravenous (IV) contrast for this exam pending the discretion of the Radiologist.  You do not need to do anything special to prepare.  **If you will be receiving sedation or general anesthesia, please see special notes below.**  What should I wear: The MRI machine uses a strong magnet. Please wear clothes without metal (snaps, zippers). A sweatsuit works well, or we may give you a hospital gown. Please remove any body piercings and hair extensions before you arrive. You will also remove watches, jewelry, hairpins, wallets, dentures, partial dental plates and hearing aids. You may wear contact lenses, and you may be able to wear your rings. We have a safe place to keep your personal items, but it is safer to leave them at home.  How long does the exam take: Most tests take 30 to 60 minutes.  HOWEVER, IF YOUR DOCTOR PRESCRIBES ANESTHESIA please plan on spending four to five hours in the  recovery room.  What should I bring:  Bring a list of your current medicines to your exam (including vitamins, minerals and over-the-counter drugs).  Do I need a :  **If you will be receiving sedation or general anesthesia, please see special notes below.**  What should I do after the exam: No Restrictions, You may resume normal activities.  What is this test: MRI (magnetic resonance imaging) uses a strong magnet and radio waves to look inside the body. An MRA (magnetic resonance angiogram) does the same thing, but it lets us look at your blood vessels. A computer turns the radio waves into pictures showing cross sections of the body, much like slices of bread. This helps us see any problems more clearly. You may receive fluid (called  contrast ) before or during your scan. The fluid helps us see the pictures better. We give the fluid through an IV (small needle in your arm).  Who should I call with questions:  Please call the Imaging Department at your exam site with any questions. Directions, parking instructions, and other information is available on our website, [email or web address].  How do I prepare if I m having sedation or anesthesia? **IMPORTANT** THE INSTRUCTIONS BELOW ARE ONLY FOR THOSE PATIENTS WHO HAVE BEEN TOLD THEY WILL RECEIVE SEDATION OR GENERAL ANESTHESIA DURING THEIR MRI PROCEDURE:  IF YOU WILL RECEIVE SEDATION (take medicine to help you relax during your exam): You must get the medicine from your doctor before you arrive. Bring the medicine to the exam. Do not take it at home. Arrive one hour early. Bring someone who can take you home after the test. Your medicine will make you sleepy. After the exam, you may not drive, take a bus or take a taxi by yourself. No eating 8 hours before your exam. You may have clear liquids up until 4 hours before your exam. (Clear liquids include water, clear tea, black coffee and fruit juice without pulp.)  IF YOU WILL RECEIVE ANESTHESIA (be asleep for your  exam): Arrive 1 1/2 hours early. Bring someone who can take you home after the test. You may not drive, take a bus or take a taxi by yourself. No eating 8 hours before your exam. You may have clear liquids up until 4 hours before your exam. (Clear liquids include water, clear tea, black coffee and fruit juice without pulp.)            Oct 18, 2018 11:45 AM CDT   MR THORACIC SPINE W/O & W CONTRAST with MGMR1   [facility] ([facility])    [address] [place] [address] [phone]           How do I prepare for my exam? (Food and drink instructions) **If you will be receiving sedation or general anesthesia, please see special notes below.**  How do I prepare for my exam? (Other instructions) Take your medicines as usual, unless your doctor tells you not to. You may or may not receive intravenous (IV) contrast for this exam pending the discretion of the Radiologist.  You do not need to do anything special to prepare.  **If you will be receiving sedation or general anesthesia, please see special notes below.**  What should I wear: The MRI machine uses a strong magnet. Please wear clothes without metal (snaps, zippers). A sweatsuit works well, or we may give you a hospital gown. Please remove any body piercings and hair extensions before you arrive. You will also remove watches, jewelry, hairpins, wallets, dentures, partial dental plates and hearing aids. You may wear contact lenses, and you may be able to wear your rings. We have a safe place to keep your personal items, but it is safer to leave them at home.  How long does the exam take: Most tests take 30 to 60 minutes.  HOWEVER, IF YOUR DOCTOR PRESCRIBES ANESTHESIA please plan on spending four to five hours in the recovery room.  What should I bring:  Bring a list of your current medicines to your exam (including vitamins, minerals and over-the-counter drugs).  Do I need a :  **If you will be receiving  sedation or general anesthesia, please see special notes below.**  What should I do after the exam: No Restrictions, You may resume normal activities.  What is this test: MRI (magnetic resonance imaging) uses a strong magnet and radio waves to look inside the body. An MRA (magnetic resonance angiogram) does the same thing, but it lets us look at your blood vessels. A computer turns the radio waves into pictures showing cross sections of the body, much like slices of bread. This helps us see any problems more clearly. You may receive fluid (called  contrast ) before or during your scan. The fluid helps us see the pictures better. We give the fluid through an IV (small needle in your arm).  Who should I call with questions:  Please call the Imaging Department at your exam site with any questions. Directions, parking instructions, and other information is available on our website, [email or web address].  How do I prepare if I m having sedation or anesthesia? **IMPORTANT** THE INSTRUCTIONS BELOW ARE ONLY FOR THOSE PATIENTS WHO HAVE BEEN TOLD THEY WILL RECEIVE SEDATION OR GENERAL ANESTHESIA DURING THEIR MRI PROCEDURE:  IF YOU WILL RECEIVE SEDATION (take medicine to help you relax during your exam): You must get the medicine from your doctor before you arrive. Bring the medicine to the exam. Do not take it at home. Arrive one hour early. Bring someone who can take you home after the test. Your medicine will make you sleepy. After the exam, you may not drive, take a bus or take a taxi by yourself. No eating 8 hours before your exam. You may have clear liquids up until 4 hours before your exam. (Clear liquids include water, clear tea, black coffee and fruit juice without pulp.)  IF YOU WILL RECEIVE ANESTHESIA (be asleep for your exam): Arrive 1 1/2 hours early. Bring someone who can take you home after the test. You may not drive, take a bus or take a taxi by yourself. No eating 8 hours before your exam. You may have clear  liquids up until 4 hours before your exam. (Clear liquids include water, clear tea, black coffee and fruit juice without pulp.)            Oct 23, 2018  9:30 AM CDT   Return Visit with Yoel Ugarte MD   [facility] ([facility])    [address] [place] [id number] [phone]              Future tests that were ordered for you today     Open Future Orders        Priority Expected Expires Ordered    MR Thoracic Spine w/o & w Contrast Routine  10/9/2019 10/9/2018    MR Lumbar Spine w/o & w Contrast Routine  10/9/2019 10/9/2018            Who to contact     If you have questions or need follow up information about today's clinic visit or your schedule please contact [facility] directly at [phone].  Normal or non-critical lab and imaging results will be communicated to you by Playspacehart, letter or phone within 4 business days after the clinic has received the results. If you do not hear from us within 7 days, please contact the clinic through [employer]ooms[employer] or phone. If you have a critical or abnormal lab result, we will notify you by phone as soon as possible.  Submit refill requests through Malhar or call your pharmacy and they will forward the refill request to us. Please allow 3 business days for your refill to be completed.          Additional Information About Your Visit        Playspacehart Information     Malhar gives you secure access to your electronic health record. If you see a primary care provider, you can also send messages to your care team and make appointments. If you have questions, please call your primary care clinic.  If you do not have a primary care provider, please call [phone] and they will assist you.      Malhar is an electronic gateway that provides easy, online access to your medical records. With Malhar, you can request a clinic appointment, read your test results, renew a prescription or communicate with your care  "team.     To access your existing account, please contact your [facility] or call [phone] for assistance.        Care EveryWhere ID     This is your Care EveryWhere ID. This could be used by other organizations to access your [place] medical records  [id number]        Your Vitals Were     Pulse Height Pulse Oximetry BMI (Body Mass Index)          78 1.638 m (5' 4.5\") 100% 29.51 kg/m2         Blood Pressure from Last 3 Encounters:   10/09/18 132/84   09/05/18 146/84   08/30/18 141/90    Weight from Last 3 Encounters:   10/09/18 79.2 kg (174 lb 9.6 oz)   09/05/18 79 kg (174 lb 3.2 oz)   08/30/18 78.9 kg (174 lb)               Primary Care Provider Office Phone # Fax #    Suzy Marcella Fajardo, APRN [phone] [address]  [facility] [address]        Equal Access to Services     JENNIFER MCGHEE : Hadii aad ku hadasho Soomaali, waaxda luqadaha, qaybta kaalmada adeegyada, waxay idiin haybeth cole kharash cleo . So [facility] [phone].    ATENCIÓN: Si habla español, tiene a kaur disposición servicios gratuitos de asistencia lingüística. Llame al [phone].    We comply with applicable federal civil rights laws and [place] laws. We do not discriminate on the basis of race, color, national origin, age, disability, sex, sexual orientation, or gender identity.            Thank you!     Thank you for choosing [facility]  for your care. Our goal is always to provide you with excellent care. Hearing back from our patients is one way we can continue to improve our services. Please take a few minutes to complete the written survey that you may receive in the mail after your visit with us. Thank you!             Your Updated Medication List - Protect others around you: Learn how to safely use, store and throw away your medicines at www.disposemymeds.org.          This list is accurate as of 10/9/18 11:30 AM.  Always use your most recent med " list.                   Brand Name Dispense Instructions for use Diagnosis    cetirizine 10 MG tablet    zyrTEC     Take 10 mg by mouth daily        levonorgestrel 20 MCG/24HR IUD    MIRENA    1 each    1 each (20 mcg) by Intrauterine route once for 1 dose    IUD (intrauterine device) in place       NEW MED      CBD oil -8 drops BID
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
## 2018-10-09 NOTE — NURSING NOTE
"Marcella Justice's goals for this visit include: consult  She requests these members of her care team be copied on today's visit information:     PCP: Suzy Fajardo    Referring Provider:  Suzy Fajardo, APRN CNP  [address] [place] [address]    /84  Pulse 78  Ht 1.638 m (5' 4.5\")  Wt 79.2 kg (174 lb 9.6 oz)  SpO2 100%  BMI 29.51 kg/m2    Do you need any medication refills at today's visit? n  "

## 2018-10-09 NOTE — NURSING NOTE
GUCCI faxed to [facility], [place], operated by [facility] for back xrays.    Liza Schwarz LPN

## 2018-10-18 ENCOUNTER — RADIANT APPOINTMENT (OUTPATIENT)
Dept: MRI IMAGING | Facility: CLINIC | Age: 48
End: 2018-10-18
Attending: PSYCHIATRY & NEUROLOGY
Payer: COMMERCIAL

## 2018-10-18 DIAGNOSIS — R33.9 URINARY RETENTION: ICD-10-CM

## 2018-10-18 LAB — RADIOLOGIST FLAGS: NORMAL

## 2018-10-18 PROCEDURE — 72158 MRI LUMBAR SPINE W/O & W/DYE: CPT | Performed by: RADIOLOGY

## 2018-10-18 PROCEDURE — 72157 MRI CHEST SPINE W/O & W/DYE: CPT | Performed by: RADIOLOGY

## 2018-10-18 PROCEDURE — A9585 GADOBUTROL INJECTION: HCPCS | Mod: JW | Performed by: PSYCHIATRY & NEUROLOGY

## 2018-10-18 RX ORDER — GADOBUTROL 604.72 MG/ML
10 INJECTION INTRAVENOUS ONCE
Status: COMPLETED | OUTPATIENT
Start: 2018-10-18 | End: 2018-10-18

## 2018-10-18 RX ADMIN — GADOBUTROL 10 ML: 604.72 INJECTION INTRAVENOUS at 12:31

## 2018-10-23 ENCOUNTER — OFFICE VISIT (OUTPATIENT)
Dept: NEUROLOGY | Facility: CLINIC | Age: 48
End: 2018-10-23
Payer: COMMERCIAL

## 2018-10-23 VITALS — OXYGEN SATURATION: 100 % | DIASTOLIC BLOOD PRESSURE: 86 MMHG | SYSTOLIC BLOOD PRESSURE: 123 MMHG | HEART RATE: 70 BPM

## 2018-10-23 DIAGNOSIS — R33.9 URINARY RETENTION: Primary | ICD-10-CM

## 2018-10-23 PROCEDURE — 99213 OFFICE O/P EST LOW 20 MIN: CPT | Performed by: PSYCHIATRY & NEUROLOGY

## 2018-10-23 ASSESSMENT — PAIN SCALES - GENERAL: PAINLEVEL: NO PAIN (0)

## 2018-10-23 NOTE — PROGRESS NOTES
Visit Date:   10/23/2018      NEUROLOGY CONSULTATION      The patient is seen in followup with incomplete bladder emptying.  I initially saw her a couple weeks ago.  She is here today with her , Rich.  She did have an MRI of the thoracic and lumbar spine.  I have reviewed these images with her.  There is multilevel degenerative change, but no compromise of neural structures and no central canal stenosis.  There is no cauda equina lesion.  She reports that the symptom is improved.  She is no longer having to self-cath and that has been the case for a week.  She is going to the bathroom normally.  She has no complaints.      PHYSICAL EXAMINATION:   GENERAL:  The patient is cooperative and in no distress.   VITAL SIGNS:  Her blood pressure is 123/86.     EXTREMITIES:  Muscle stretch reflexes are low amplitude and symmetric in the arms, normal at the knees, and reduced at the ankles.  Toes are downgoing.  Gait is unremarkable.      ASSESSMENT:  Incomplete bladder emptying, now improved.      DISCUSSION:  The patient is seen in follow-up with incomplete bladder emptying.  Etiology of this symptom is not determined.  Her symptoms are now improved.  I had a long talk with the patient today about her situation.  There was a  MRI of the cervical spine, which does show straightening of the cervical spine with a question of spondylosis.  I think it would be reasonable to obtain an MRI of the cervical spine to make sure there is no central lesion at that level to explain her symptoms.  I am going to go ahead and order that.  I will communicate the results of that test to her when available.  Otherwise, I would monitor her neurologic status.  She is in agreement with the plan.  Followup will be on an as-needed basis.         VANESSA BRYANT MD             D: 10/23/2018   T: 10/23/2018   MT: SARIAH      Name:     ROE CHOE   MRN:      [id number]        Account:      [id number]   :      1970            Visit Date:   10/23/2018      Document: [id number]          10/29 addendum: reviewed MRI cervical with pt.  No significant cord lesion or spinal stenosis to explain sx.  F/u prn.

## 2018-10-23 NOTE — LETTER
10/23/2018         RE: Marcella Justice  [address]  [place] [address]        Dear Colleague,    Thank you for referring your patient, Marcella Justice, to the [facility]. Please see a copy of my visit note below.    Visit Date:   10/23/2018      NEUROLOGY CONSULTATION      The patient is seen in followup with incomplete bladder emptying.  I initially saw her a couple weeks ago.  She is here today with her , Rich.  She did have an MRI of the thoracic and lumbar spine.  I have reviewed these images with her.  There is multilevel degenerative change, but no compromise of neural structures and no central canal stenosis.  There is no cauda equina lesion.  She reports that the symptom is improved.  She is no longer having to self-cath and that has been the case for a week.  She is going to the bathroom normally.  She has no complaints.      PHYSICAL EXAMINATION:   GENERAL:  The patient is cooperative and in no distress.   VITAL SIGNS:  Her blood pressure is 123/86.     EXTREMITIES:  Muscle stretch reflexes are low amplitude and symmetric in the arms, normal at the knees, and reduced at the ankles.  Toes are downgoing.  Gait is unremarkable.      ASSESSMENT:  Incomplete bladder emptying, now improved.      DISCUSSION:  The patient is seen in follow-up with incomplete bladder emptying.  Etiology of this symptom is not determined.  Her symptoms are now improved.  I had a long talk with the patient today about her situation.  There was a  MRI of the cervical spine, which does show straightening of the cervical spine with a question of spondylosis.  I think it would be reasonable to obtain an MRI of the cervical spine to make sure there is no central lesion at that level to explain her symptoms.  I am going to go ahead and order that.  I will communicate the results of that test to her when available.  Otherwise, I would monitor her neurologic status.  She is in agreement with  the plan.  Followup will be on an as-needed basis.         VANESSA UGARTE MD             D: 10/23/2018   T: 10/23/2018   MT: SARIAH      Name:     ROE CHOE   MRN:      [id number]        Account:      [id number]   :      1970           Visit Date:   10/23/2018      Document: [id number]       Again, thank you for allowing me to participate in the care of your patient.        Sincerely,        Vanessa Ugarte MD

## 2018-10-23 NOTE — MR AVS SNAPSHOT
After Visit Summary   10/23/2018    Marcella Justice    MRN: [id number]           Patient Information     Date Of Birth          1970        Visit Information        Provider Department      10/23/2018 9:30 AM Yoel Ugarte MD [facility]        Today's Diagnoses     Urinary retention    -  1       Follow-ups after your visit        Follow-up notes from your care team     Return if symptoms worsen or fail to improve.      Your next 10 appointments already scheduled     Oct 29, 2018 11:45 AM CDT   MR CERVICAL SPINE W/O & W CONTRAST with MGMR[facility]   [facility]    [address] [place] [id number] [phone]           How do I prepare for my exam? (Food and drink instructions) **If you will be receiving sedation or general anesthesia, please see special notes below.**  How do I prepare for my exam? (Other instructions) Take your medicines as usual, unless your doctor tells you not to. You may or may not receive intravenous (IV) contrast for this exam pending the discretion of the Radiologist.  You do not need to do anything special to prepare.  **If you will be receiving sedation or general anesthesia, please see special notes below.**  What should I wear: The MRI machine uses a strong magnet. Please wear clothes without metal (snaps, zippers). A sweatsuit works well, or we may give you a hospital gown. Please remove any body piercings and hair extensions before you arrive. You will also remove watches, jewelry, hairpins, wallets, dentures, partial dental plates and hearing aids. You may wear contact lenses, and you may be able to wear your rings. We have a safe place to keep your personal items, but it is safer to leave them at home.  How long does the exam take: Most tests take 30 to 60 minutes.  HOWEVER, IF YOUR DOCTOR PRESCRIBES ANESTHESIA please plan on spending four to five hours in the recovery room.   What should I bring:  Bring a list of your current medicines to your exam (including vitamins, minerals and over-the-counter drugs).  Do I need a :  **If you will be receiving sedation or general anesthesia, please see special notes below.**  What should I do after the exam: No Restrictions, You may resume normal activities.  What is this test: MRI (magnetic resonance imaging) uses a strong magnet and radio waves to look inside the body. An MRA (magnetic resonance angiogram) does the same thing, but it lets us look at your blood vessels. A computer turns the radio waves into pictures showing cross sections of the body, much like slices of bread. This helps us see any problems more clearly. You may receive fluid (called  contrast ) before or during your scan. The fluid helps us see the pictures better. We give the fluid through an IV (small needle in your arm).  Who should I call with questions:  Please call the Imaging Department at your exam site with any questions. Directions, parking instructions, and other information is available on our website, [email or web address].  How do I prepare if I m having sedation or anesthesia? **IMPORTANT** THE INSTRUCTIONS BELOW ARE ONLY FOR THOSE PATIENTS WHO HAVE BEEN TOLD THEY WILL RECEIVE SEDATION OR GENERAL ANESTHESIA DURING THEIR MRI PROCEDURE:  IF YOU WILL RECEIVE SEDATION (take medicine to help you relax during your exam): You must get the medicine from your doctor before you arrive. Bring the medicine to the exam. Do not take it at home. Arrive one hour early. Bring someone who can take you home after the test. Your medicine will make you sleepy. After the exam, you may not drive, take a bus or take a taxi by yourself. No eating 8 hours before your exam. You may have clear liquids up until 4 hours before your exam. (Clear liquids include water, clear tea, black coffee and fruit juice without pulp.)  IF YOU WILL RECEIVE ANESTHESIA (be asleep for your exam): Arrive 1  1/2 hours early. Bring someone who can take you home after the test. You may not drive, take a bus or take a taxi by yourself. No eating 8 hours before your exam. You may have clear liquids up until 4 hours before your exam. (Clear liquids include water, clear tea, black coffee and fruit juice without pulp.)              Future tests that were ordered for you today     Open Future Orders        Priority Expected Expires Ordered    MR Cervical Spine w/o & w Contrast Routine  10/23/2019 10/23/2018            Who to contact     If you have questions or need follow up information about today's clinic visit or your schedule please contact [facility] directly at [phone].  Normal or non-critical lab and imaging results will be communicated to you by Whoteverhart, letter or phone within 4 business days after the clinic has received the results. If you do not hear from us within 7 days, please contact the clinic through NEXAGEt or phone. If you have a critical or abnormal lab result, we will notify you by phone as soon as possible.  Submit refill requests through Weatherista or call your pharmacy and they will forward the refill request to us. Please allow 3 business days for your refill to be completed.          Additional Information About Your Visit        WhoteverharBranded Online Information     [employer]ista gives you secure access to your electronic health record. If you see a primary care provider, you can also send messages to your care team and make appointments. If you have questions, please call your primary care clinic.  If you do not have a primary care provider, please call [phone] and they will assist you.      [employer]ista is an electronic gateway that provides easy, online access to your medical records. With [employer]ista, you can request a clinic appointment, read your test results, renew a prescription or communicate with your care team.     To access your existing account, please contact your [facility] or call [phone] for assistance.        Care EveryWhere ID     This is your Care EveryWhere ID. This could be used by other organizations to access your [facility] medical records  [id number]        Your Vitals Were     Pulse Pulse Oximetry                70 100%           Blood Pressure from Last 3 Encounters:   10/23/18 123/86   10/09/18 132/84   09/05/18 146/84    Weight from Last 3 Encounters:   10/09/18 79.2 kg (174 lb 9.6 oz)   09/05/18 79 kg (174 lb 3.2 oz)   08/30/18 78.9 kg (174 lb)               Primary Care Provider Office Phone # Fax #    Suzy SHARMAINE Anguiano [phone] [address]  [place] [facility] [address]        Equal Access to Services     JENNIFER MCGHEE : Hadii aad ku hadasho Soomaali, waaxda luqadaha, qaybta kaalmada adeegyada, jesus donnelly haybeth gallo . So [facility] [phone].    ATENCIÓN: Si habla español, tiene a kaur disposición servicios gratuitos de asistencia lingüística. Phan al [phone].    We comply with applicable federal civil rights laws and [place] laws. We do not discriminate on the basis of race, color, national origin, age, disability, sex, sexual orientation, or gender identity.            Thank you!     Thank you for choosing [facility]  for your care. Our goal is always to provide you with excellent care. Hearing back from our patients is one way we can continue to improve our services. Please take a few minutes to complete the written survey that you may receive in the mail after your visit with us. Thank you!             Your Updated Medication List - Protect others around you: Learn how to safely use, store and throw away your medicines at www.disposemymeds.org.          This list is accurate as of 10/23/18 10:10 AM.  Always use your most recent med list.                   Brand Name Dispense Instructions for use Diagnosis    cetirizine 10 MG tablet    zyrTEC     Take 10 mg by mouth  daily        levonorgestrel 20 MCG/24HR IUD    MIRENA    1 each    1 each (20 mcg) by Intrauterine route once for 1 dose    IUD (intrauterine device) in place       NEW MED      CBD oil -8 drops BID

## 2018-10-23 NOTE — NURSING NOTE
Marcella Justice's goals for this visit include: return  She requests these members of her care team be copied on today's visit information:     PCP: Suzy Fajardo    Referring Provider:  No referring provider defined for this encounter.    /86  Pulse 70  SpO2 100%    Do you need any medication refills at today's visit? n

## 2018-10-29 ENCOUNTER — RADIANT APPOINTMENT (OUTPATIENT)
Dept: MRI IMAGING | Facility: CLINIC | Age: 48
End: 2018-10-29
Attending: PSYCHIATRY & NEUROLOGY
Payer: COMMERCIAL

## 2018-10-29 DIAGNOSIS — R33.9 URINARY RETENTION: ICD-10-CM

## 2018-10-29 PROCEDURE — A9585 GADOBUTROL INJECTION: HCPCS | Performed by: PSYCHIATRY & NEUROLOGY

## 2018-10-29 PROCEDURE — 72156 MRI NECK SPINE W/O & W/DYE: CPT | Performed by: RADIOLOGY

## 2018-10-29 RX ORDER — GADOBUTROL 604.72 MG/ML
10 INJECTION INTRAVENOUS ONCE
Status: COMPLETED | OUTPATIENT
Start: 2018-10-29 | End: 2018-10-29

## 2018-10-29 RX ADMIN — GADOBUTROL 10 ML: 604.72 INJECTION INTRAVENOUS at 13:00

## 2019-01-16 ENCOUNTER — OFFICE VISIT (OUTPATIENT)
Dept: OPTOMETRY | Facility: CLINIC | Age: 49
End: 2019-01-16
Payer: COMMERCIAL

## 2019-01-16 DIAGNOSIS — H10.13 ALLERGIC CONJUNCTIVITIS OF BOTH EYES: ICD-10-CM

## 2019-01-16 DIAGNOSIS — H52.4 ASTIGMATISM OF BOTH EYES WITH PRESBYOPIA: Primary | ICD-10-CM

## 2019-01-16 DIAGNOSIS — H52.203 ASTIGMATISM OF BOTH EYES WITH PRESBYOPIA: Primary | ICD-10-CM

## 2019-01-16 PROCEDURE — 92002 INTRM OPH EXAM NEW PATIENT: CPT | Performed by: OPTOMETRIST

## 2019-01-16 ASSESSMENT — TONOMETRY
IOP_METHOD: APPLANATION
OS_IOP_MMHG: 22
OD_IOP_MMHG: 21

## 2019-01-16 ASSESSMENT — EXTERNAL EXAM - LEFT EYE: OS_EXAM: NORMAL

## 2019-01-16 ASSESSMENT — REFRACTION_MANIFEST
OS_ADD: +2.25
OD_AXIS: 141
OS_AXIS: 030
OS_SPHERE: +0.25
OS_AXIS: 039
OD_CYLINDER: +0.75
OD_ADD: +2.25
OD_SPHERE: +0.25
OD_CYLINDER: +0.75
OD_SPHERE: +0.25
OD_AXIS: 152
OS_CYLINDER: +0.50
OS_SPHERE: PLANO
OS_CYLINDER: +0.50

## 2019-01-16 ASSESSMENT — REFRACTION_WEARINGRX
OD_SPHERE: +1.75
OS_CYLINDER: SPHERE
SPECS_TYPE: SVL
OD_CYLINDER: SPHERE
OS_SPHERE: +1.75

## 2019-01-16 ASSESSMENT — VISUAL ACUITY
OS_SC: 20/20
OD_SC: 20/30
OD_SC: J10
OD_SC+: -1
METHOD: SNELLEN - LINEAR
OS_SC: J10
OS_CC: J1
OD_CC: J1

## 2019-01-16 ASSESSMENT — CONF VISUAL FIELD
OS_NORMAL: 1
METHOD: COUNTING FINGERS
OD_NORMAL: 1

## 2019-01-16 ASSESSMENT — EXTERNAL EXAM - RIGHT EYE: OD_EXAM: EPIPHORA

## 2019-01-16 ASSESSMENT — CUP TO DISC RATIO
OD_RATIO: 0.2
OS_RATIO: 0.2

## 2019-01-16 ASSESSMENT — SLIT LAMP EXAM - LIDS
COMMENTS: NORMAL
COMMENTS: NORMAL

## 2019-01-16 NOTE — PROGRESS NOTES
Chief Complaint   Patient presents with     COMPREHENSIVE EYE EXAM     Patient notes DVA is more difficult. Patient has started to wear +1.75 OTC readers while doing computer work. Patient states she is unable to read or do close work without readers.      Marcella has tried progressives in the past and did not like the small reading area. She is considering contacts and I discussed workspace glasses     Last Eye Exam: 2 years ago  Dilated Previously: Yes    What are you currently using to see?  Readers +1.75       Distance Vision Acuity: Noticed gradual change in both eyes    Near Vision Acuity: Satisfied with vision while reading with readers    Eye Comfort: watery, always in the right eye, not every day and sometimes burns  Do you use eye drops? : Yes: otc allergy drops, uses only as needed, usually 3-4 times a week  Occupation or Hobbies:     Svetlana Gallardo, [employer]         Medical, surgical and family histories reviewed and updated 1/16/2019.       OBJECTIVE: See Ophthalmology exam    ASSESSMENT:    ICD-10-CM    1. Astigmatism of both eyes with presbyopia H52.203     H52.4    2. Allergic conjunctivitis of both eyes H10.13       PLAN:     Patient Instructions   Please return for the dilated portion of the exam.    Your eyes may be blurry at near and sensitive to light for several hours from the dilating drops.    Yearly eye exams recommended.    Thank you!

## 2019-01-16 NOTE — PROGRESS NOTES
Patient presents with:  COMPREHENSIVE EYE EXAM: Patient notes DVA is more difficult. Patient has started to wear +1.75 OTC readers while doing computer work. Patient states she is unable to read or do close work without readers.      Referring Provider:  No referring provider defined for this encounter.        Deyanira Cassidy, COA

## 2019-01-16 NOTE — PATIENT INSTRUCTIONS
Please return for the dilated portion of the exam.    Your eyes may be blurry at near and sensitive to light for several hours from the dilating drops.    Yearly eye exams recommended.    Thank you!

## 2019-01-16 NOTE — LETTER
1/16/2019         RE: Marcella Justice  [address]  [place] [address]        Dear Colleague,    Thank you for referring your patient, Marcella Justice, to the [facility]. Please see a copy of my visit note below.    Chief Complaint   Patient presents with     COMPREHENSIVE EYE EXAM     Patient notes DVA is more difficult. Patient has started to wear +1.75 OTC readers while doing computer work. Patient states she is unable to read or do close work without readers.      Marcella has tried progressives in the past and did not like the small reading area. She is considering contacts and I discussed workspace glasses     Last Eye Exam: 2 years ago  Dilated Previously: Yes    What are you currently using to see?  Readers +1.75       Distance Vision Acuity: Noticed gradual change in both eyes    Near Vision Acuity: Satisfied with vision while reading with readers    Eye Comfort: watery, always in the right eye, not every day and sometimes burns  Do you use eye drops? : Yes: otc allergy drops, uses only as needed, usually 3-4 times a week  Occupation or Hobbies:     Svetlana Gallardo, [employer]         Medical, surgical and family histories reviewed and updated 1/16/2019.       OBJECTIVE: See Ophthalmology exam    ASSESSMENT:    ICD-10-CM    1. Astigmatism of both eyes with presbyopia H52.203     H52.4    2. Allergic conjunctivitis of both eyes H10.13       PLAN:     Patient Instructions   Please return for the dilated portion of the exam.    Your eyes may be blurry at near and sensitive to light for several hours from the dilating drops.    Yearly eye exams recommended.    Thank you!             Patient presents with:  COMPREHENSIVE EYE EXAM: Patient notes DVA is more difficult. Patient has started to wear +1.75 OTC readers while doing computer work. Patient states she is unable to read or do close work without readers.      Referring Provider:  No referring provider defined  for this encounter.        Deyanira Cassidy, COA      Again, thank you for allowing me to participate in the care of your patient.        Sincerely,        Ruba Ag OD

## 2019-02-26 ENCOUNTER — TRANSFERRED RECORDS (OUTPATIENT)
Dept: HEALTH INFORMATION MANAGEMENT | Facility: CLINIC | Age: 49
End: 2019-02-26

## 2019-03-14 ENCOUNTER — ANCILLARY PROCEDURE (OUTPATIENT)
Dept: GENERAL RADIOLOGY | Facility: CLINIC | Age: 49
End: 2019-03-14
Attending: NURSE PRACTITIONER
Payer: COMMERCIAL

## 2019-03-14 ENCOUNTER — TELEPHONE (OUTPATIENT)
Dept: FAMILY MEDICINE | Facility: CLINIC | Age: 49
End: 2019-03-14

## 2019-03-14 ENCOUNTER — OFFICE VISIT (OUTPATIENT)
Dept: FAMILY MEDICINE | Facility: CLINIC | Age: 49
End: 2019-03-14
Payer: COMMERCIAL

## 2019-03-14 VITALS
SYSTOLIC BLOOD PRESSURE: 118 MMHG | HEART RATE: 109 BPM | WEIGHT: 161.6 LBS | DIASTOLIC BLOOD PRESSURE: 82 MMHG | BODY MASS INDEX: 26.92 KG/M2 | TEMPERATURE: 98.4 F | OXYGEN SATURATION: 98 % | HEIGHT: 65 IN

## 2019-03-14 DIAGNOSIS — J20.9 ACUTE BRONCHITIS WITH SYMPTOMS > 10 DAYS: Primary | ICD-10-CM

## 2019-03-14 DIAGNOSIS — J45.20 ASTHMA, MILD INTERMITTENT, WELL-CONTROLLED: ICD-10-CM

## 2019-03-14 DIAGNOSIS — Z11.4 SCREENING FOR HIV (HUMAN IMMUNODEFICIENCY VIRUS): ICD-10-CM

## 2019-03-14 DIAGNOSIS — J06.9 VIRAL URI WITH COUGH: ICD-10-CM

## 2019-03-14 PROCEDURE — 99214 OFFICE O/P EST MOD 30 MIN: CPT | Performed by: NURSE PRACTITIONER

## 2019-03-14 PROCEDURE — 71046 X-RAY EXAM CHEST 2 VIEWS: CPT

## 2019-03-14 RX ORDER — CODEINE PHOSPHATE AND GUAIFENESIN 10; 100 MG/5ML; MG/5ML
1-2 SOLUTION ORAL EVERY 4 HOURS PRN
Qty: 118 ML | Refills: 0 | Status: SHIPPED | OUTPATIENT
Start: 2019-03-14

## 2019-03-14 RX ORDER — ALBUTEROL SULFATE 90 UG/1
2 AEROSOL, METERED RESPIRATORY (INHALATION)
COMMUNITY
Start: 2016-10-04 | End: 2019-03-14

## 2019-03-14 RX ORDER — PREDNISONE 20 MG/1
40 TABLET ORAL DAILY
Qty: 10 TABLET | Refills: 0 | Status: SHIPPED | OUTPATIENT
Start: 2019-03-14 | End: 2019-03-19

## 2019-03-14 RX ORDER — ALBUTEROL SULFATE 90 UG/1
2 AEROSOL, METERED RESPIRATORY (INHALATION) EVERY 4 HOURS PRN
Qty: 8.5 G | Refills: 1 | Status: SHIPPED | OUTPATIENT
Start: 2019-03-14

## 2019-03-14 RX ORDER — CODEINE PHOSPHATE AND GUAIFENESIN 10; 100 MG/5ML; MG/5ML
1-2 SOLUTION ORAL EVERY 4 HOURS PRN
Qty: 118 ML | Refills: 0 | Status: SHIPPED | OUTPATIENT
Start: 2019-03-14 | End: 2019-03-14

## 2019-03-14 ASSESSMENT — MIFFLIN-ST. JEOR: SCORE: 1350.95

## 2019-03-14 NOTE — TELEPHONE ENCOUNTER
Huddled with provider, script was possibly attached to AVS from today visit, per provider. Script re-printed by provider and faxed by provider to pharmacy.   Writer called and notified pharmacy of this.    Amy Galan RN

## 2019-03-14 NOTE — PROGRESS NOTES
SUBJECTIVE:   Marcella Justice is a 48 year old female who presents to clinic today for the following health issues:      RESPIRATORY SYMPTOMS      Duration: since last Saturday    Description  facial pain/pressure, cough-dry til yesterday, worse in the am but becoming productive  Now (yellow), sneezing and ear pain/plugged both, headaches, No fever but she's had chills    Severity: severe    Accompanying signs and symptoms: None    History (predisposing factors):  none    Precipitating or alleviating factors: None    Therapies tried and outcome:  alkaseltzer cold and flu daytime and nighttime and ibuprofen  Patient is a Realtor, 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 Chest feels tight and she admits to nighttime wheezing, cough.  She rarely uses Albuterol except for when she has URI- needs refill.    Problem list and histories reviewed & adjusted, as indicated.  Additional history: as documented    Patient Active Problem List   Diagnosis     Asthma, mild intermittent, well-controlled     Nontoxic uninodular goiter     History of umbilical hernia repair     IUD (intrauterine device) in place     Urinary retention     Past Surgical History:   Procedure Laterality Date     HERNIA REPAIR  02/2017    umbical hernia     TUBAL LIGATION         Social History     Tobacco Use     Smoking status: Never Smoker     Smokeless tobacco: Never Used   Substance Use Topics     Alcohol use: Yes     Comment: a little      Family History   Problem Relation Age of Onset     Thyroid Disease Mother          Current Outpatient Medications   Medication Sig Dispense Refill     albuterol (PROAIR HFA) 108 (90 Base) MCG/ACT inhaler Inhale 2 puffs into the lungs       cetirizine (ZYRTEC) 10 MG tablet Take 10 mg by mouth daily       levonorgestrel (MIRENA) 20 MCG/24HR IUD 1 each (20 mcg) by Intrauterine route once for 1 dose 1 each 0     NEW MED CBD oil -8 drops BID       BP Readings from Last 3 Encounters:   03/14/19 118/82   10/23/18  "123/86   10/09/18 132/84    Wt Readings from Last 3 Encounters:   03/14/19 73.3 kg (161 lb 9.6 oz)   10/09/18 79.2 kg (174 lb 9.6 oz)   09/05/18 79 kg (174 lb 3.2 oz)                    Reviewed and updated as needed this visit by clinical staff  Tobacco  Allergies  Meds  Med Hx  Surg Hx  Fam Hx  Soc Hx      Reviewed and updated as needed this visit by Provider  Tobacco         ROS:  Constitutional, HEENT, cardiovascular, pulmonary, gi and gu systems are negative, except as otherwise noted.    OBJECTIVE:     /82   Pulse 109   Temp 98.4  F (36.9  C) (Oral)   Ht 1.638 m (5' 4.5\")   Wt 73.3 kg (161 lb 9.6 oz)   SpO2 98%   BMI 27.31 kg/m    Body mass index is 27.31 kg/m .  GENERAL: healthy, alert and no distress  EYES: Eyes grossly normal to inspection, PERRL and conjunctivae and sclerae normal  HENT: ear canals and TM's normal, nose and mouth without ulcers or lesions  NECK: no adenopathy, no asymmetry, masses, or scars and thyroid normal to palpation  RESP: rhonchi bilateral and throughout  CV: regular rate and rhythm, normal S1 S2, no S3 or S4, no murmur, click or rub, no peripheral edema and peripheral pulses strong  ABDOMEN: soft, nontender, no hepatosplenomegaly, no masses and bowel sounds normal  MS: no gross musculoskeletal defects noted, no edema    Diagnostic Test Results:  No results found for this or any previous visit (from the past 24 hour(s)).  Xray - no acute changes by my independent read, await formal read by Radiology.    Order   XR Chest 2 Views [[id number]] (Order [id number])   Exam Information     Exam Date Exam Time Accession # Performing Department Results    3/14/19  4:41 PM [id number] [facility]    PACS Images      Show images for XR Chest 2 Views   Study Result     XR CHEST TWO VIEWS  3/14/2019 4:41 PM      HISTORY: Viral upper respiratory infection with cough.     COMPARISON: 3/27/2014.      FINDINGS: Cardiomediastinal silhouette within normal " "limits. No focal  airspace opacities. No pleural effusion. No pneumothorax identified.  The bones are unremarkable.                                                                      IMPRESSION: No acute cardiopulmonary abnormalities.     CHRISTEL MENDOZA MD       ASSESSMENT/PLAN:         BP Screening:   Last 3 BP Readings:    BP Readings from Last 3 Encounters:   03/14/19 118/82   10/23/18 123/86   10/09/18 132/84       The following was recommended to the patient:  Re-screen BP within a year and recommended lifestyle modifications  BMI:   Estimated body mass index is 27.31 kg/m  as calculated from the following:    Height as of this encounter: 1.638 m (5' 4.5\").    Weight as of this encounter: 73.3 kg (161 lb 9.6 oz).         1. Acute bronchitis with symptoms > 10 days  BRONCHITIS    Antibiotic not  indicated, see orders.  We  discussed the pathophysiology of bronchitis.  I reviewed the risks and benefits of various over the counter and prescription medications.  Additionally, we reviewed the infectious nature of this condition and techniques to minimize transmission and future infections.    Patient advised to follow up if symptoms worsen or fail to improve as anticipated.   - guaiFENesin-codeine (ROBITUSSIN AC) 100-10 MG/5ML solution; Take 5-10 mLs by mouth every 4 hours as needed for cough  Dispense: 118 mL; Refill: 0    2. Asthma, mild intermittent, well-controlled  Will give Prednisone burst for asthma, refill Albuterol, reviewed indications for return to clinic. Asthma Action Plan reviewed.  - predniSONE (DELTASONE) 20 MG tablet; Take 40 mg by mouth daily for 5 days.  Dispense: 10 tablet; Refill: 0  - albuterol (PROAIR HFA) 108 (90 Base) MCG/ACT inhaler; Inhale 2 puffs into the lungs every 4 hours as needed for shortness of breath / dyspnea or wheezing  Dispense: 8.5 g; Refill: 1    3. Screening for HIV (human immunodeficiency virus)  Patienthahad HIV screen done in the past (negative per patient).      Work " on weight loss  Regular exercise    SHARMAINE Pérez CNP  [facility]

## 2019-03-14 NOTE — PATIENT INSTRUCTIONS
At [facility], we strive to deliver an exceptional experience to you, every time we see you.  If you receive a survey in the mail, please send us back your thoughts. We really do value your feedback.    Your care team:                            Family Medicine Internal Medicine   MD David Forrest MD Shantel Branch-Fleming, MD Katya Georgiev PA-C Megan Hill, APRN CHACHO Sutherland MD Pediatrics   Alexis Leigh, APARNA Fajardo, MD Brandi Montoya APRN CNP   MD Keily Larry MD Deborah Mielke, MD Mahi Driscoll, APRN [facility]      Clinic hours: Monday - Thursday 7 am-7 pm; Fridays 7 am-5 pm.   Urgent care: Monday - Friday 11 am-9 pm; Saturday and Sunday 9 am-5 pm.  Pharmacy : Monday -Thursday 8 am-8 pm; Friday 8 am-6 pm; Saturday and Sunday 9 am-5 pm.     Clinic: [phone]   Pharmacy: [phone]

## 2019-03-14 NOTE — Clinical Note
Please abstract the following data from this visit with this patient into the appropriate field in Epic:Mammogram done on this date: 3/2019 (approximately), by this group: [facility], results were normal.

## 2019-03-14 NOTE — LETTER
My Asthma Action Plan  Name: Marcella Justice   YOB: 1970  Date: 3/14/2019   My doctor: SHARMAINE Pérez CNP   My clinic: [facility]        My Control Medicine: None  My Rescue Medicine: Albuterol (Proair/Ventolin/Proventil) inhaler inhale 2 puffs every 4 hours as needed for cough, sob, wheezing   My Asthma Severity: intermittent  Avoid your asthma triggers: upper respiratory infections               GREEN ZONE   Good Control    I feel good    No cough or wheeze    Can work, sleep and play without asthma symptoms       Take your asthma control medicine every day.     1. If exercise triggers your asthma, take your rescue medication    15 minutes before exercise or sports, and    During exercise if you have asthma symptoms  2. Spacer to use with inhaler: If you have a spacer, make sure to use it with your inhaler             YELLOW ZONE Getting Worse  I have ANY of these:    I do not feel good    Cough or wheeze    Chest feels tight    Wake up at night   1. Keep taking your Green Zone medications  2. Start taking your rescue medicine:    every 20 minutes for up to 1 hour. Then every 4 hours for 24-48 hours.  3. If you stay in the Yellow Zone for more than 12-24 hours, contact your doctor.  4. If you do not return to the Green Zone in 12-24 hours or you get worse, start taking your oral steroid medicine if prescribed by your provider.           RED ZONE Medical Alert - Get Help  I have ANY of these:    I feel awful    Medicine is not helping    Breathing getting harder    Trouble walking or talking    Nose opens wide to breathe       1. Take your rescue medicine NOW  2. If your provider has prescribed an oral steroid medicine, start taking it NOW  3. Call your doctor NOW  4. If you are still in the Red Zone after 20 minutes and you have not reached your doctor:    Take your rescue medicine again and    Call 911 or go to the emergency room right away    See your regular  doctor within 2 weeks of an Emergency Room or Urgent Care visit for follow-up treatment.          Annual Reminders:  Meet with Asthma Educator,  Flu Shot in the Fall, consider Pneumonia Vaccination for patients with asthma (aged 19 and older).    Pharmacy: [id number]  [place], [place] - [address]                      Asthma Triggers  How To Control Things That Make Your Asthma Worse    Triggers are things that make your asthma worse.  Look at the list below to help you find your triggers and what you can do about them.  You can help prevent asthma flare-ups by staying away from your triggers.      Trigger                                                          What you can do   Cigarette Smoke  Tobacco smoke can make asthma worse. Do not allow smoking in your home, car or around you.  Be sure no one smokes at a child s day care or school.  If you smoke, ask your health care provider for ways to help you quit.  Ask family members to quit too.  Ask your health care provider for a referral to Quit Plan to help you quit smoking, or call 2-161-142-PLAN.     Colds, Flu, Bronchitis  These are common triggers of asthma. Wash your hands often.  Don t touch your eyes, nose or mouth.  Get a flu shot every year.     Dust Mites  These are tiny bugs that live in cloth or carpet. They are too small to see. Wash sheets and blankets in hot water every week.   Encase pillows and mattress in dust mite proof covers.  Avoid having carpet if you can. If you have carpet, vacuum weekly.   Use a dust mask and HEPA vacuum.   Pollen and Outdoor Mold  Some people are allergic to trees, grass, or weed pollen, or molds. Try to keep your windows closed.  Limit time out doors when pollen count is high.   Ask you health care provider about taking medicine during allergy season.     Animal Dander  Some people are allergic to skin flakes, urine or saliva from pets with fur or feathers. Keep pets with fur or feathers out of your  home.    If you can t keep the pet outdoors, then keep the pet out of your bedroom.  Keep the bedroom door closed.  Keep pets off cloth furniture and away from stuffed toys.     Mice, Rats, and Cockroaches  Some people are allergic to the waste from these pests.   Cover food and garbage.  Clean up spills and food crumbs.  Store grease in the refrigerator.   Keep food out of the bedroom.   Indoor Mold  This can be a trigger if your home has high moisture. Fix leaking faucets, pipes, or other sources of water.   Clean moldy surfaces.  Dehumidify basement if it is damp and smelly.   Smoke, Strong Odors, and Sprays  These can reduce air quality. Stay away from strong odors and sprays, such as perfume, powder, hair spray, paints, smoke incense, paint, cleaning products, candles and new carpet.   Exercise or Sports  Some people with asthma have this trigger. Be active!  Ask your doctor about taking medicine before sports or exercise to prevent symptoms.    Warm up for 5-10 minutes before and after sports or exercise.     Other Triggers of Asthma  Cold air:  Cover your nose and mouth with a scarf.  Sometimes laughing or crying can be a trigger.  Some medicines and food can trigger asthma.

## 2019-03-14 NOTE — TELEPHONE ENCOUNTER
Reason for Call:  Other prescription    Detailed comments: guaiFENesin-codeine (ROBITUSSIN AC) 100-10 MG/5ML solution was not received by pharmacy please resend     Phone Number Patient can be reached at: [phone]    Best Time: Any     Can we leave a detailed message on this number? YES    Call taken on 3/14/2019 at 6:08 PM by Tony Walker

## 2019-03-16 ASSESSMENT — ASTHMA QUESTIONNAIRES: ACT_TOTALSCORE: 9

## 2019-08-02 ENCOUNTER — TELEPHONE (OUTPATIENT)
Dept: FAMILY MEDICINE | Facility: CLINIC | Age: 49
End: 2019-08-02

## 2019-08-02 NOTE — LETTER
August 2, 2019      Marcella Justice  [address]  [place] [address]        Dear Marcella Justice,      At [facility] we care about your health and are committed to providing quality patient care. It has come to our attention that you are due for an Asthma Control Test.     This screening tool helps us to assess how well your asthma is controlled. Good asthma control leads to less asthma symptoms and greater health. If your asthma is not in good control (score less than 20) it is recommended you be seen by your provider for medication and lifestyle adjustments    We have enclosed an  Asthma Control Test. Please complete the enclosed asthma control test even if you are feeling well. You can mail it back to our clinic or drop if off at our .     Thank you for your time and we hope this letter finds you well!      Sincerely,    Your Team at [facility]                                      Dear Marcella Justice,      At [facility] we care about your health and are committed to providing quality patient care, which includes staying current on preventative cancer screenings.  You can increase your chances of finding and treating cancers through regular screenings.      Our records show that you are due for the following screening(s):    Pap Smear for cervical cancer   [facility] at [phone]  Recommended every three years for women 21 and older  A Pap test is used to detect cervical cancer.  The test should be taken at least once every three years but women who are at a greater risk for cervical cancer may need to have the test more often.      You are at a greater risk for cervical cancer if:   - You have had a sexually transmitted disease   - You have had more than one sex partner   - You have had an abnormal pap test in the past      You may contact the closest location to schedule the screening test(s) at your earliest convenience.    If  you have already had one or all of the above screening tests at another facility, please call us so that we may update your chart.      Sincerely,    Quality Committee at [facility]

## 2019-08-02 NOTE — TELEPHONE ENCOUNTER
Panel Management Review   One phone call and send letter if unable to reach them or [employer] message and send letter if not read after 2 weeks (You will get a message to your inbasket)      BP Readings from Last 1 Encounters:   03/14/19 118/82    , No results found for: A1C, 3/14/2019    Health Maintenance Due   Topic Date Due     COLONOSCOPY  07/18/1979     HIV SCREENING  07/18/1984     PHQ-2  01/01/2019     PAP  03/08/2019     PREVENTIVE CARE VISIT  06/21/2019     ZOSTER IMMUNIZATION (1 of 2) 07/18/2019        Fail List measure: asthma and pap        Patient is due/failing the following:   ACT and PAP    Action needed:   Patient needs office visit for physical. and Patient needs to do ACT.    Type of outreach:    Sent letter. and Copy of ACT mailed to patient, will reach out in 5 days.    Questions for provider review:    None                                                                                       Chart routed to n/a Bryce Best

## 2019-08-08 ENCOUNTER — TELEPHONE (OUTPATIENT)
Dept: FAMILY MEDICINE | Facility: CLINIC | Age: 49
End: 2019-08-08

## 2019-08-08 NOTE — LETTER
August 8, 2019    Marcella Justice  [address]  [place] [address]    Dear Marcella Justice,    At [facility] we care about your health and are committed to providing quality patient care. It has come to our attention that you are due for an Asthma Control Test.     This screening tool helps us to assess how well your asthma is controlled. Good asthma control leads to less asthma symptoms and greater health. If your asthma is not in good control (score less than 20) it is recommended you be seen by your provider for medication and lifestyle adjustments    We have enclosed an  Asthma Control Test. Please complete the enclosed asthma control test even if you are feeling well. You can mail it back to our clinic or drop if off at our .     Thank you for your time and we hope this letter finds you well!      Sincerely,    Your Team at [facility]

## 2019-09-13 NOTE — TELEPHONE ENCOUNTER
This writer attempted to contact Patient on 09/13/19      Reason for call ACT and left message.      If patient calls back:   2nd floor [facility] Team (MA/TC) called. Inform patient that someone from the team will contact them, document that pt called and route to care team.         Lashonda Best

## 2020-01-02 NOTE — TELEPHONE ENCOUNTER
Panel Management Review   One phone call and send letter if unable to reach them or [employer] message and send letter if not read after 2 weeks (You will get a message to your inbasket)      BP Readings from Last 1 Encounters:   03/14/19 118/82    , No results found for: A1C, 8/2/2019    Health Maintenance Due   Topic Date Due     COLONOSCOPY  07/18/1979     HIV SCREENING  07/18/1984     PREVENTIVE CARE VISIT  06/21/2019     INFLUENZA VACCINE (1) 09/01/2019     ASTHMA CONTROL TEST  09/14/2019     PHQ-2  01/01/2020     ZOSTER IMMUNIZATION (1 of 2) 07/18/2019        Fail List measure: Asthma        Patient is due/failing the following:   ACT    Action needed:   Patient needs to do ACT.    Type of outreach:    Sent [employer] message.    Questions for provider review:    None                                                                                       Chart routed to KRISHAN Marroquin

## 2020-02-08 ENCOUNTER — HEALTH MAINTENANCE LETTER (OUTPATIENT)
Age: 50
End: 2020-02-08

## 2023-04-12 ENCOUNTER — ANCILLARY PROCEDURE (OUTPATIENT)
Dept: MAMMOGRAPHY | Facility: CLINIC | Age: 53
End: 2023-04-12
Payer: COMMERCIAL

## 2023-04-12 DIAGNOSIS — Z12.31 VISIT FOR SCREENING MAMMOGRAM: ICD-10-CM

## 2023-04-12 PROCEDURE — 77067 SCR MAMMO BI INCL CAD: CPT | Performed by: RADIOLOGY

## 2023-04-12 PROCEDURE — 77063 BREAST TOMOSYNTHESIS BI: CPT | Performed by: RADIOLOGY

## 2023-06-01 ENCOUNTER — HEALTH MAINTENANCE LETTER (OUTPATIENT)
Age: 53
End: 2023-06-01

## 2024-06-02 ENCOUNTER — HEALTH MAINTENANCE LETTER (OUTPATIENT)
Age: 54
End: 2024-06-02

## 2025-06-15 ENCOUNTER — HEALTH MAINTENANCE LETTER (OUTPATIENT)
Age: 55
End: 2025-06-15